# Patient Record
Sex: FEMALE | Race: WHITE | NOT HISPANIC OR LATINO | Employment: FULL TIME | URBAN - METROPOLITAN AREA
[De-identification: names, ages, dates, MRNs, and addresses within clinical notes are randomized per-mention and may not be internally consistent; named-entity substitution may affect disease eponyms.]

---

## 2015-09-04 LAB — EXTERNAL HIV SCREEN: NORMAL

## 2018-08-20 ENCOUNTER — OFFICE VISIT (OUTPATIENT)
Dept: INTERNAL MEDICINE CLINIC | Facility: CLINIC | Age: 38
End: 2018-08-20
Payer: COMMERCIAL

## 2018-08-20 VITALS
SYSTOLIC BLOOD PRESSURE: 120 MMHG | DIASTOLIC BLOOD PRESSURE: 82 MMHG | WEIGHT: 131.8 LBS | BODY MASS INDEX: 21.96 KG/M2 | HEART RATE: 96 BPM | OXYGEN SATURATION: 98 % | HEIGHT: 65 IN

## 2018-08-20 DIAGNOSIS — M79.10 MYALGIA: ICD-10-CM

## 2018-08-20 DIAGNOSIS — R53.83 FATIGUE, UNSPECIFIED TYPE: ICD-10-CM

## 2018-08-20 DIAGNOSIS — Z00.00 ANNUAL PHYSICAL EXAM: Primary | ICD-10-CM

## 2018-08-20 DIAGNOSIS — Z13.6 SCREENING FOR HEART DISEASE: ICD-10-CM

## 2018-08-20 PROCEDURE — 99385 PREV VISIT NEW AGE 18-39: CPT | Performed by: PHYSICIAN ASSISTANT

## 2018-08-20 NOTE — PROGRESS NOTES
Assessment/Plan:   General medical exam is excellent  Will supplement by doing screening labs  Will also check thyroid functions and vitamin-D level  Will discuss results when available  Recommended yearly follow-up  Review labs in 3 weeks  Return in about 3 weeks (around 9/10/2018) for Next scheduled follow up  Diagnoses and all orders for this visit:    Annual physical exam    Screening for heart disease  -     Lipid panel; Future    Fatigue, unspecified type  -     TSH, 3rd generation; Future  -     T4, free; Future  -     CBC and differential; Future  -     Comprehensive metabolic panel; Future    Myalgia  -     Vitamin D 25 hydroxy; Future          Subjective:      Patient ID: Karo Louis is a 45 y o  female  28-year-old white female presents to establish with this practice and for health assessment  Patient states she generally feels well, however recently has been feeling increased stress  She has noted change in her appetite and is not sleeping well  She feels anxious about going back to work, as she is a   She has noted some mild fatigue and irritability along with headaches recently  History/PE as documented in the EMR        ALLERGIES:  No Known Allergies    CURRENT MEDICATIONS:  No current outpatient prescriptions on file  ACTIVE PROBLEM LIST:  Patient Active Problem List   Diagnosis    Annual physical exam       PAST MEDICAL HISTORY:  No past medical history on file  PAST SURGICAL HISTORY:  No past surgical history on file      FAMILY HISTORY:  Family History   Problem Relation Age of Onset    COPD Father     Hyperlipidemia Father     Asthma Father     Heart disease Maternal Grandmother     Breast cancer Maternal Grandmother     Diabetes Maternal Grandmother     Heart disease Maternal Grandfather     Diabetes Maternal Grandfather     Diabetes Paternal Grandmother        SOCIAL HISTORY:  Social History     Social History    Marital status: /Civil [employer]     Spouse name: N/A    Number of children: N/A    Years of education: N/A     Occupational History    Not on file  Social History Main Topics    Smoking status: Never Smoker    Smokeless tobacco: Never Used    Alcohol use 1 2 oz/week     1 Glasses of wine, 1 Cans of beer per week      Comment: per week    Drug use: No    Sexual activity: Yes     Partners: Male     Other Topics Concern    Not on file     Social History Narrative        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, brushes twice daily    Hobbies-reading       Review of Systems   Constitutional: Negative for activity change, chills, fatigue and fever  HENT: Negative for congestion  Eyes: Negative for discharge  Respiratory: Negative for cough, chest tightness and shortness of breath  Cardiovascular: Negative for chest pain, palpitations and leg swelling  Gastrointestinal: Negative for abdominal pain  Genitourinary: Negative for difficulty urinating  Musculoskeletal: Negative for arthralgias and myalgias  Skin: Negative for rash  Allergic/Immunologic: Negative for immunocompromised state  Neurological: Positive for headaches  Negative for dizziness, syncope, weakness and light-headedness  Hematological: Negative for adenopathy  Does not bruise/bleed easily  Psychiatric/Behavioral: Positive for dysphoric mood and sleep disturbance  Negative for suicidal ideas  The patient is nervous/anxious  Objective:  Vitals:    08/20/18 1054   BP: 120/82   BP Location: Left arm   Patient Position: Sitting   Pulse: 96   SpO2: 98%   Weight: 59 8 kg (131 lb 12 8 oz)   Height: 5' 5" (1 651 m)        Physical Exam   Constitutional: She is oriented to person, place, and time  She appears well-developed and well-nourished  No distress     HENT:   Right Ear: External ear normal    Left Ear: External ear normal    Nose: Nose normal    Mouth/Throat: Oropharynx is clear and moist    Eyes: Conjunctivae and EOM are normal  Pupils are equal, round, and reactive to light  Neck: Normal range of motion  Neck supple  Carotid bruit is not present  No thyromegaly present  Cardiovascular: Normal rate, regular rhythm, normal heart sounds and intact distal pulses  Pulmonary/Chest: Effort normal and breath sounds normal    Abdominal: Soft  Bowel sounds are normal  There is no tenderness  Musculoskeletal: Normal range of motion  She exhibits no edema  Lymphadenopathy:     She has no cervical adenopathy  Neurological: She is alert and oriented to person, place, and time  She has normal reflexes  She displays normal reflexes  No cranial nerve deficit  She exhibits normal muscle tone  Coordination normal    Skin: Skin is warm and dry  No rash noted  Psychiatric: She has a normal mood and affect  Her behavior is normal  Judgment and thought content normal    Nursing note and vitals reviewed  RESULTS:    No results found for this or any previous visit (from the past 1008 hour(s))  This note was created with voice recognition software  Phonic, grammatical and spelling errors may be present within the note as a result

## 2018-08-20 NOTE — PATIENT INSTRUCTIONS
General medical exam is excellent  Will supplement by doing screening labs  Will also check thyroid functions and vitamin-D level  Will discuss results when available  Recommended yearly follow-up  Wellness Visit for Adults   AMBULATORY CARE:   A wellness visit  is when you see your healthcare provider to get screened for health problems  You can also get advice on how to stay healthy  Write down your questions so you remember to ask them  Ask your healthcare provider how often you should have a wellness visit  What happens at a wellness visit:  Your healthcare provider will ask about your health, and your family history of health problems  This includes high blood pressure, heart disease, and cancer  He or she will ask if you have symptoms that concern you, if you smoke, and about your mood  You may also be asked about your intake of medicines, supplements, food, and alcohol  Any of the following may be done:  · Your weight  will be checked  Your height may also be checked so your body mass index (BMI) can be calculated  Your BMI shows if you are at a healthy weight  · Your blood pressure  and heart rate will be checked  Your temperature may also be checked  · Blood and urine tests  may be done  Blood tests may be done to check your cholesterol levels  Abnormal cholesterol levels increase your risk for heart disease and stroke  You may also need a blood or urine test to check for diabetes if you are at increased risk  Urine tests may be done to look for signs of an infection or kidney disease  · A physical exam  includes checking your heartbeat and lungs with a stethoscope  Your healthcare provider may also check your skin to look for sun damage  · Screening tests  may be recommended  A screening test is done to check for diseases that may not cause symptoms  The screening tests you may need depend on your age, gender, family history, and lifestyle habits   For example, colorectal screening may be recommended if you are 48years old or older  Screening tests you need if you are a woman:   · A Pap smear  is used to screen for cervical cancer  Pap smears are usually done every 3 to 5 years depending on your age  You may need them more often if you have had abnormal Pap smear test results in the past  Ask your healthcare provider how often you should have a Pap smear  · A mammogram  is an x-ray of your breasts to screen for breast cancer  Experts recommend mammograms every 2 years starting at age 48 years  You may need a mammogram at age 52 years or younger if you have an increased risk for breast cancer  Talk to your healthcare provider about when you should start having mammograms and how often you need them  Vaccines you may need:   · Get an influenza vaccine  every year  The influenza vaccine protects you from the flu  Several types of viruses cause the flu  The viruses change over time, so new vaccines are made each year  · Get a tetanus-diphtheria (Td) booster vaccine  every 10 years  This vaccine protects you against tetanus and diphtheria  Tetanus is a severe infection that may cause painful muscle spasms and lockjaw  Diphtheria is a severe bacterial infection that causes a thick covering in the back of your mouth and throat  · Get a human papillomavirus (HPV) vaccine  if you are female and aged 23 to 32 or male 23 to 24 and never received it  This vaccine protects you from HPV infection  HPV is the most common infection spread by sexual contact  HPV may also cause vaginal, penile, and anal cancers  · Get a pneumococcal vaccine  if you are aged 72 years or older  The pneumococcal vaccine is an injection given to protect you from pneumococcal disease  Pneumococcal disease is an infection caused by pneumococcal bacteria  The infection may cause pneumonia, meningitis, or an ear infection  · Get a shingles vaccine  if you are aged 61 or older, even if you have had shingles before  The shingles vaccine is an injection to protect you from the varicella-zoster virus  This is the same virus that causes chickenpox  Shingles is a painful rash that develops in people who had chickenpox or have been exposed to the virus  How to eat healthy:  My Plate is a model for planning healthy meals  It shows the types and amounts of foods that should go on your plate  Fruits and vegetables make up about half of your plate, and grains and protein make up the other half  A serving of dairy is included on the side of your plate  The amount of calories and serving sizes you need depends on your age, gender, weight, and height  Examples of healthy foods are listed below:  · Eat a variety of vegetables  such as dark green, red, and orange vegetables  You can also include canned vegetables low in sodium (salt) and frozen vegetables without added butter or sauces  · Eat a variety of fresh fruits , canned fruit in 100% juice, frozen fruit, and dried fruit  · Include whole grains  At least half of the grains you eat should be whole grains  Examples include whole-wheat bread, wheat pasta, brown rice, and whole-grain cereals such as oatmeal     · Eat a variety of protein foods such as seafood (fish and shellfish), lean meat, and poultry without skin (turkey and chicken)  Examples of lean meats include pork leg, shoulder, or tenderloin, and beef round, sirloin, tenderloin, and extra lean ground beef  Other protein foods include eggs and egg substitutes, beans, peas, soy products, nuts, and seeds  · Choose low-fat dairy products such as skim or 1% milk or low-fat yogurt, cheese, and cottage cheese  · Limit unhealthy fats  such as butter, hard margarine, and shortening  Exercise:  Exercise at least 30 minutes per day on most days of the week  Some examples of exercise include walking, biking, dancing, and swimming   You can also fit in more physical activity by taking the stairs instead of the elevator or parking farther away from stores  Include muscle strengthening activities 2 days each week  Regular exercise provides many health benefits  It helps you manage your weight, and decreases your risk for type 2 diabetes, heart disease, stroke, and high blood pressure  Exercise can also help improve your mood  Ask your healthcare provider about the best exercise plan for you  General health and safety guidelines:   · Do not smoke  Nicotine and other chemicals in cigarettes and cigars can cause lung damage  Ask your healthcare provider for information if you currently smoke and need help to quit  E-cigarettes or smokeless tobacco still contain nicotine  Talk to your healthcare provider before you use these products  · Limit alcohol  A drink of alcohol is 12 ounces of beer, 5 ounces of wine, or 1½ ounces of liquor  · Lose weight, if needed  Being overweight increases your risk of certain health conditions  These include heart disease, high blood pressure, type 2 diabetes, and certain types of cancer  · Protect your skin  Do not sunbathe or use tanning beds  Use sunscreen with a SPF 15 or higher  Apply sunscreen at least 15 minutes before you go outside  Reapply sunscreen every 2 hours  Wear protective clothing, hats, and sunglasses when you are outside  · Drive safely  Always wear your seatbelt  Make sure everyone in your car wears a seatbelt  A seatbelt can save your life if you are in an accident  Do not use your cell phone when you are driving  This could distract you and cause an accident  Pull over if you need to make a call or send a text message  · Practice safe sex  Use latex condoms if are sexually active and have more than one partner  Your healthcare provider may recommend screening tests for sexually transmitted infections (STIs)  · Wear helmets, lifejackets, and protective gear    Always wear a helmet when you ride a bike or motorcycle, go skiing, or play sports that could cause a head injury  Wear protective equipment when you play sports  Wear a lifejacket when you are on a boat or doing water sports  © 2017 [address] [employer] Information is for End User's use only and may not be sold, redistributed or otherwise used for commercial purposes  All illustrations and images included in CareNotes® are the copyrighted property of A D A M [employer] Inc  or [employer]  The above information is an  only  It is not intended as medical advice for individual conditions or treatments  Talk to your doctor, nurse or pharmacist before following any medical regimen to see if it is safe and effective for you

## 2018-08-23 ENCOUNTER — APPOINTMENT (OUTPATIENT)
Dept: LAB | Facility: CLINIC | Age: 38
End: 2018-08-23
Payer: COMMERCIAL

## 2018-08-23 ENCOUNTER — TRANSCRIBE ORDERS (OUTPATIENT)
Dept: LAB | Facility: CLINIC | Age: 38
End: 2018-08-23

## 2018-08-23 DIAGNOSIS — Z13.6 SCREENING FOR HEART DISEASE: ICD-10-CM

## 2018-08-23 DIAGNOSIS — R53.83 FATIGUE, UNSPECIFIED TYPE: ICD-10-CM

## 2018-08-23 DIAGNOSIS — M79.10 MYALGIA: ICD-10-CM

## 2018-08-23 LAB
25(OH)D3 SERPL-MCNC: 35.2 NG/ML (ref 30–100)
ALBUMIN SERPL BCP-MCNC: 3.8 G/DL (ref 3.5–5)
ALP SERPL-CCNC: 47 U/L (ref 46–116)
ALT SERPL W P-5'-P-CCNC: 11 U/L (ref 12–78)
ANION GAP SERPL CALCULATED.3IONS-SCNC: 7 MMOL/L (ref 4–13)
AST SERPL W P-5'-P-CCNC: 8 U/L (ref 5–45)
BASOPHILS # BLD AUTO: 0.04 THOUSANDS/ΜL (ref 0–0.1)
BASOPHILS NFR BLD AUTO: 1 % (ref 0–1)
BILIRUB SERPL-MCNC: 1.36 MG/DL (ref 0.2–1)
BUN SERPL-MCNC: 16 MG/DL (ref 5–25)
CALCIUM SERPL-MCNC: 9.1 MG/DL (ref 8.3–10.1)
CHLORIDE SERPL-SCNC: 107 MMOL/L (ref 100–108)
CHOLEST SERPL-MCNC: 158 MG/DL (ref 50–200)
CO2 SERPL-SCNC: 26 MMOL/L (ref 21–32)
CREAT SERPL-MCNC: 0.71 MG/DL (ref 0.6–1.3)
EOSINOPHIL # BLD AUTO: 0.05 THOUSAND/ΜL (ref 0–0.61)
EOSINOPHIL NFR BLD AUTO: 1 % (ref 0–6)
ERYTHROCYTE [DISTWIDTH] IN BLOOD BY AUTOMATED COUNT: 12.1 % (ref 11.6–15.1)
GFR SERPL CREATININE-BSD FRML MDRD: 108 ML/MIN/1.73SQ M
GLUCOSE P FAST SERPL-MCNC: 70 MG/DL (ref 65–99)
HCT VFR BLD AUTO: 42.3 % (ref 34.8–46.1)
HDLC SERPL-MCNC: 53 MG/DL (ref 40–60)
HGB BLD-MCNC: 14.1 G/DL (ref 11.5–15.4)
IMM GRANULOCYTES # BLD AUTO: 0.01 THOUSAND/UL (ref 0–0.2)
IMM GRANULOCYTES NFR BLD AUTO: 0 % (ref 0–2)
LDLC SERPL CALC-MCNC: 92 MG/DL (ref 0–100)
LYMPHOCYTES # BLD AUTO: 2.19 THOUSANDS/ΜL (ref 0.6–4.47)
LYMPHOCYTES NFR BLD AUTO: 44 % (ref 14–44)
MCH RBC QN AUTO: 31.2 PG (ref 26.8–34.3)
MCHC RBC AUTO-ENTMCNC: 33.3 G/DL (ref 31.4–37.4)
MCV RBC AUTO: 94 FL (ref 82–98)
MONOCYTES # BLD AUTO: 0.32 THOUSAND/ΜL (ref 0.17–1.22)
MONOCYTES NFR BLD AUTO: 6 % (ref 4–12)
NEUTROPHILS # BLD AUTO: 2.41 THOUSANDS/ΜL (ref 1.85–7.62)
NEUTS SEG NFR BLD AUTO: 48 % (ref 43–75)
NONHDLC SERPL-MCNC: 105 MG/DL
NRBC BLD AUTO-RTO: 0 /100 WBCS
PLATELET # BLD AUTO: 226 THOUSANDS/UL (ref 149–390)
PMV BLD AUTO: 10.3 FL (ref 8.9–12.7)
POTASSIUM SERPL-SCNC: 4.3 MMOL/L (ref 3.5–5.3)
PROT SERPL-MCNC: 7.3 G/DL (ref 6.4–8.2)
RBC # BLD AUTO: 4.52 MILLION/UL (ref 3.81–5.12)
SODIUM SERPL-SCNC: 140 MMOL/L (ref 136–145)
T4 FREE SERPL-MCNC: 0.91 NG/DL (ref 0.76–1.46)
TRIGL SERPL-MCNC: 65 MG/DL
TSH SERPL DL<=0.05 MIU/L-ACNC: 2.5 UIU/ML (ref 0.36–3.74)
WBC # BLD AUTO: 5.02 THOUSAND/UL (ref 4.31–10.16)

## 2018-08-23 PROCEDURE — 80061 LIPID PANEL: CPT

## 2018-08-23 PROCEDURE — 84443 ASSAY THYROID STIM HORMONE: CPT

## 2018-08-23 PROCEDURE — 85025 COMPLETE CBC W/AUTO DIFF WBC: CPT

## 2018-08-23 PROCEDURE — 84439 ASSAY OF FREE THYROXINE: CPT

## 2018-08-23 PROCEDURE — 82306 VITAMIN D 25 HYDROXY: CPT

## 2018-08-23 PROCEDURE — 80053 COMPREHEN METABOLIC PANEL: CPT

## 2018-08-23 PROCEDURE — 36415 COLL VENOUS BLD VENIPUNCTURE: CPT

## 2018-09-14 ENCOUNTER — OFFICE VISIT (OUTPATIENT)
Dept: INTERNAL MEDICINE CLINIC | Facility: CLINIC | Age: 38
End: 2018-09-14
Payer: COMMERCIAL

## 2018-09-14 VITALS
TEMPERATURE: 98.9 F | OXYGEN SATURATION: 98 % | BODY MASS INDEX: 21.86 KG/M2 | HEIGHT: 65 IN | WEIGHT: 131.2 LBS | HEART RATE: 94 BPM | DIASTOLIC BLOOD PRESSURE: 68 MMHG | SYSTOLIC BLOOD PRESSURE: 128 MMHG

## 2018-09-14 DIAGNOSIS — F41.9 ANXIETY: Primary | ICD-10-CM

## 2018-09-14 PROCEDURE — 3008F BODY MASS INDEX DOCD: CPT | Performed by: PHYSICIAN ASSISTANT

## 2018-09-14 PROCEDURE — 99213 OFFICE O/P EST LOW 20 MIN: CPT | Performed by: PHYSICIAN ASSISTANT

## 2018-09-14 RX ORDER — FLUOXETINE 10 MG/1
10 TABLET, FILM COATED ORAL DAILY
Qty: 30 TABLET | Refills: 1 | Status: SHIPPED | OUTPATIENT
Start: 2018-09-14 | End: 2018-10-09 | Stop reason: SDUPTHER

## 2018-09-14 NOTE — PROGRESS NOTES
Assessment/Plan:   Patient Instructions   Will initiate therapy with low-dose fluoxetine 10 mg 1 daily  Will get together in 1 month the see how the medication is working  Return in about 4 weeks (around 10/12/2018)  Diagnoses and all orders for this visit:    Anxiety  -     FLUoxetine (PROzac) 10 MG tablet; Take 1 tablet (10 mg total) by mouth daily          Subjective:      Patient ID: Ace Lares is a 45 y o  female  Follow-up    Patient returns today to discuss her lab results  She also wanted to discuss anxiety/depression and treatment options that we had discussed at the end of her last visit  Her lab results are excellent  These were reviewed directly with the patient  Anxiety/depression:  Patient would like to start medication at this time  We discussed different medications available and have tried to narrow down 1 that will not cause significant weight gain  We reviewed potential side effects  She understands she needs to return in 1 month to assess its effectiveness  She was instructed that it would take approximately 2 weeks to start working  No other acute concerns today  ALLERGIES:  No Known Allergies    CURRENT MEDICATIONS:    Current Outpatient Prescriptions:     FLUoxetine (PROzac) 10 MG tablet, Take 1 tablet (10 mg total) by mouth daily, Disp: 30 tablet, Rfl: 1    ACTIVE PROBLEM LIST:  Patient Active Problem List   Diagnosis    Annual physical exam    Anxiety       PAST MEDICAL HISTORY:  No past medical history on file  PAST SURGICAL HISTORY:  No past surgical history on file      FAMILY HISTORY:  Family History   Problem Relation Age of Onset    COPD Father     Hyperlipidemia Father     Asthma Father     Heart disease Maternal Grandmother     Breast cancer Maternal Grandmother     Diabetes Maternal Grandmother     Heart disease Maternal Grandfather     Diabetes Maternal Grandfather     Diabetes Paternal Grandmother        SOCIAL HISTORY:  Social History     Social History    Marital status: /Civil Union     Spouse name: N/A    Number of children: N/A    Years of education: N/A     Occupational History    Not on file  Social History Main Topics    Smoking status: Never Smoker    Smokeless tobacco: Never Used    Alcohol use 1 2 oz/week     1 Glasses of wine, 1 Cans of beer per week      Comment: per week    Drug use: No    Sexual activity: Yes     Partners: Male     Other Topics Concern    Not on file     Social History Narrative        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, brushes twice daily    Hobbies-reading       Review of Systems   Constitutional: Negative for activity change, chills, fatigue and fever  HENT: Negative for congestion  Eyes: Negative for discharge  Respiratory: Negative for cough, chest tightness and shortness of breath  Cardiovascular: Negative for chest pain, palpitations and leg swelling  Gastrointestinal: Negative for abdominal pain  Genitourinary: Negative for difficulty urinating  Musculoskeletal: Negative for arthralgias and myalgias  Skin: Negative for rash  Allergic/Immunologic: Negative for immunocompromised state  Neurological: Negative for dizziness, syncope, weakness, light-headedness and headaches  Hematological: Negative for adenopathy  Does not bruise/bleed easily  Psychiatric/Behavioral: Positive for dysphoric mood  Negative for sleep disturbance and suicidal ideas  The patient is not nervous/anxious  Objective:  Vitals:    09/14/18 0757   BP: 128/68   BP Location: Left arm   Patient Position: Sitting   Pulse: 94   Temp: 98 9 °F (37 2 °C)   SpO2: 98%   Weight: 59 5 kg (131 lb 3 2 oz)   Height: 5' 5" (1 651 m)        Physical Exam   Constitutional: She is oriented to person, place, and time  She appears well-developed and well-nourished  Neck: Neck supple  No thyromegaly present     Cardiovascular: Normal rate, regular rhythm and normal heart sounds  Pulmonary/Chest: Effort normal and breath sounds normal    Abdominal: There is no tenderness  Musculoskeletal: She exhibits no edema  Neurological: She is alert and oriented to person, place, and time  Skin: Skin is warm and dry  No rash noted  Psychiatric: She has a normal mood and affect  Her behavior is normal    Nursing note and vitals reviewed          RESULTS:    Recent Results (from the past 1008 hour(s))   TSH, 3rd generation    Collection Time: 08/23/18  7:20 AM   Result Value Ref Range    TSH 3RD GENERATON 2 500 0 358 - 3 740 uIU/mL   T4, free    Collection Time: 08/23/18  7:20 AM   Result Value Ref Range    Free T4 0 91 0 76 - 1 46 ng/dL   CBC and differential    Collection Time: 08/23/18  7:20 AM   Result Value Ref Range    WBC 5 02 4 31 - 10 16 Thousand/uL    RBC 4 52 3 81 - 5 12 Million/uL    Hemoglobin 14 1 11 5 - 15 4 g/dL    Hematocrit 42 3 34 8 - 46 1 %    MCV 94 82 - 98 fL    MCH 31 2 26 8 - 34 3 pg    MCHC 33 3 31 4 - 37 4 g/dL    RDW 12 1 11 6 - 15 1 %    MPV 10 3 8 9 - 12 7 fL    Platelets 851 133 - 649 Thousands/uL    nRBC 0 /100 WBCs    Neutrophils Relative 48 43 - 75 %    Immat GRANS % 0 0 - 2 %    Lymphocytes Relative 44 14 - 44 %    Monocytes Relative 6 4 - 12 %    Eosinophils Relative 1 0 - 6 %    Basophils Relative 1 0 - 1 %    Neutrophils Absolute 2 41 1 85 - 7 62 Thousands/µL    Immature Grans Absolute 0 01 0 00 - 0 20 Thousand/uL    Lymphocytes Absolute 2 19 0 60 - 4 47 Thousands/µL    Monocytes Absolute 0 32 0 17 - 1 22 Thousand/µL    Eosinophils Absolute 0 05 0 00 - 0 61 Thousand/µL    Basophils Absolute 0 04 0 00 - 0 10 Thousands/µL   Comprehensive metabolic panel    Collection Time: 08/23/18  7:20 AM   Result Value Ref Range    Sodium 140 136 - 145 mmol/L    Potassium 4 3 3 5 - 5 3 mmol/L    Chloride 107 100 - 108 mmol/L    CO2 26 21 - 32 mmol/L    ANION GAP 7 4 - 13 mmol/L    BUN 16 5 - 25 mg/dL    Creatinine 0 71 0 60 - 1 30 mg/dL    Glucose, Fasting 70 65 - 99 mg/dL    Calcium 9 1 8 3 - 10 1 mg/dL    AST 8 5 - 45 U/L    ALT 11 (L) 12 - 78 U/L    Alkaline Phosphatase 47 46 - 116 U/L    Total Protein 7 3 6 4 - 8 2 g/dL    Albumin 3 8 3 5 - 5 0 g/dL    Total Bilirubin 1 36 (H) 0 20 - 1 00 mg/dL    eGFR 108 ml/min/1 73sq m   Lipid panel    Collection Time: 08/23/18  7:20 AM   Result Value Ref Range    Cholesterol 158 50 - 200 mg/dL    Triglycerides 65 <=150 mg/dL    HDL, Direct 53 40 - 60 mg/dL    LDL Calculated 92 0 - 100 mg/dL    Non-HDL-Chol (CHOL-HDL) 105 mg/dl   Vitamin D 25 hydroxy    Collection Time: 08/23/18  7:20 AM   Result Value Ref Range    Vit D, 25-Hydroxy 35 2 30 0 - 100 0 ng/mL       This note was created with voice recognition software  Phonic, grammatical and spelling errors may be present within the note as a result

## 2018-09-14 NOTE — PATIENT INSTRUCTIONS
Will initiate therapy with low-dose fluoxetine 10 mg 1 daily  Will get together in 1 month the see how the medication is working

## 2018-10-09 ENCOUNTER — OFFICE VISIT (OUTPATIENT)
Dept: INTERNAL MEDICINE CLINIC | Facility: CLINIC | Age: 38
End: 2018-10-09
Payer: COMMERCIAL

## 2018-10-09 VITALS
RESPIRATION RATE: 16 BRPM | BODY MASS INDEX: 21.49 KG/M2 | HEART RATE: 98 BPM | OXYGEN SATURATION: 99 % | DIASTOLIC BLOOD PRESSURE: 68 MMHG | SYSTOLIC BLOOD PRESSURE: 110 MMHG | WEIGHT: 129 LBS | HEIGHT: 65 IN

## 2018-10-09 DIAGNOSIS — F41.9 ANXIETY: Primary | ICD-10-CM

## 2018-10-09 PROCEDURE — 99213 OFFICE O/P EST LOW 20 MIN: CPT | Performed by: PHYSICIAN ASSISTANT

## 2018-10-09 RX ORDER — FLUOXETINE 20 MG/1
20 TABLET, FILM COATED ORAL DAILY
Qty: 30 TABLET | Refills: 1 | Status: SHIPPED | OUTPATIENT
Start: 2018-10-09 | End: 2018-12-02 | Stop reason: SDUPTHER

## 2018-10-09 NOTE — PROGRESS NOTES
Assessment/Plan:   Patient Instructions   Will increase Fluoxetine to 20 mg daily  Recheck in one month, sooner as needed  Did advise starting vitamin D3 1000 units daily  No Follow-up on file  Diagnoses and all orders for this visit:    Anxiety  -     FLUoxetine (PROzac) 20 MG tablet; Take 1 tablet (20 mg total) by mouth daily          Subjective:      Patient ID: Sahara Harper is a 45 y o  female  Follow up    Anxiety:  At last visit we initiated fluoxetine  Patient states she has not really sure she has felt any benefit so far  She notes during early to late afternoon she started feeling tired and yawns a lot  Her appetite also has not been very good  She does feel the anxiety may be slightly better  Overall stressors about the same  No other new concerns  ALLERGIES:  No Known Allergies    CURRENT MEDICATIONS:    Current Outpatient Prescriptions:     FLUoxetine (PROzac) 20 MG tablet, Take 1 tablet (20 mg total) by mouth daily, Disp: 30 tablet, Rfl: 1    ACTIVE PROBLEM LIST:  Patient Active Problem List   Diagnosis    Annual physical exam    Anxiety       PAST MEDICAL HISTORY:  No past medical history on file  PAST SURGICAL HISTORY:  No past surgical history on file  FAMILY HISTORY:  Family History   Problem Relation Age of Onset    COPD Father     Hyperlipidemia Father     Asthma Father     Heart disease Maternal Grandmother     Breast cancer Maternal Grandmother     Diabetes Maternal Grandmother     Heart disease Maternal Grandfather     Diabetes Maternal Grandfather     Diabetes Paternal Grandmother        SOCIAL HISTORY:  Social History     Social History    Marital status: /Civil Union     Spouse name: N/A    Number of children: N/A    Years of education: N/A     Occupational History    Not on file       Social History Main Topics    Smoking status: Never Smoker    Smokeless tobacco: Never Used    Alcohol use 1 2 oz/week     1 Glasses of wine, 1 Cans of beer per week      Comment: per week    Drug use: No    Sexual activity: Yes     Partners: Male     Other Topics Concern    Not on file     Social History Narrative        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, brushes twice daily    Hobbies-reading       Review of Systems   Constitutional: Positive for fatigue  Negative for activity change, chills and fever  HENT: Negative for congestion  Eyes: Negative for discharge  Respiratory: Negative for cough, chest tightness and shortness of breath  Cardiovascular: Negative for chest pain, palpitations and leg swelling  Gastrointestinal: Negative for abdominal pain  Genitourinary: Negative for difficulty urinating  Musculoskeletal: Negative for arthralgias and myalgias  Skin: Negative for rash  Allergic/Immunologic: Negative for immunocompromised state  Neurological: Negative for dizziness, syncope, weakness, light-headedness and headaches  Hematological: Negative for adenopathy  Does not bruise/bleed easily  Psychiatric/Behavioral: Negative for dysphoric mood, sleep disturbance and suicidal ideas  The patient is nervous/anxious  Objective:  Vitals:    10/09/18 0754   BP: 110/68   BP Location: Left arm   Patient Position: Sitting   Cuff Size: Adult   Pulse: 98   Resp: 16   SpO2: 99%   Weight: 58 5 kg (129 lb)   Height: 5' 5" (1 651 m)        Physical Exam   Constitutional: She is oriented to person, place, and time  She appears well-developed and well-nourished  No distress  Neck: Carotid bruit is not present  No thyromegaly present  Cardiovascular: Normal rate, regular rhythm and normal heart sounds  Pulmonary/Chest: Effort normal and breath sounds normal    Musculoskeletal: She exhibits no edema  Lymphadenopathy:     She has no cervical adenopathy  Neurological: She is alert and oriented to person, place, and time  Skin: Skin is warm and dry  No rash noted     Psychiatric: She has a normal mood and affect  Her behavior is normal    Nursing note and vitals reviewed  RESULTS:    No results found for this or any previous visit (from the past 1008 hour(s))  This note was created with voice recognition software  Phonic, grammatical and spelling errors may be present within the note as a result

## 2018-11-13 ENCOUNTER — OFFICE VISIT (OUTPATIENT)
Dept: INTERNAL MEDICINE CLINIC | Facility: CLINIC | Age: 38
End: 2018-11-13
Payer: COMMERCIAL

## 2018-11-13 VITALS
SYSTOLIC BLOOD PRESSURE: 118 MMHG | DIASTOLIC BLOOD PRESSURE: 68 MMHG | BODY MASS INDEX: 21.86 KG/M2 | WEIGHT: 131.2 LBS | HEART RATE: 88 BPM | OXYGEN SATURATION: 98 % | HEIGHT: 65 IN

## 2018-11-13 DIAGNOSIS — F41.9 ANXIETY: Primary | ICD-10-CM

## 2018-11-13 PROCEDURE — 99213 OFFICE O/P EST LOW 20 MIN: CPT | Performed by: PHYSICIAN ASSISTANT

## 2018-11-13 PROCEDURE — 3008F BODY MASS INDEX DOCD: CPT | Performed by: PHYSICIAN ASSISTANT

## 2018-11-13 NOTE — PROGRESS NOTES
Assessment/Plan:   Patient Instructions   Continue current medication  Follow up 6 months, sooner as needed  Return in about 6 months (around 5/13/2019) for Next scheduled follow up  Diagnoses and all orders for this visit:    Anxiety          Subjective:      Patient ID: Jaimie Candelario is a 45 y o  female  Follow-up    Anxiety:  Patient feels she is doing very well on the current dose of fluoxetine  She sees a big improvement in her anxiety, concentration, mood, focus and interaction with family in coworkers  Does not feel she needs any change in her dose  Denies any side effects at this time  Appetite has improved and she is happy with her slight weight gain  Remains very active  ALLERGIES:  No Known Allergies    CURRENT MEDICATIONS:    Current Outpatient Prescriptions:     FLUoxetine (PROzac) 20 MG tablet, Take 1 tablet (20 mg total) by mouth daily, Disp: 30 tablet, Rfl: 1    ACTIVE PROBLEM LIST:  Patient Active Problem List   Diagnosis    Annual physical exam    Anxiety       PAST MEDICAL HISTORY:  No past medical history on file  PAST SURGICAL HISTORY:  No past surgical history on file  FAMILY HISTORY:  Family History   Problem Relation Age of Onset    COPD Father     Hyperlipidemia Father     Asthma Father     Heart disease Maternal Grandmother     Breast cancer Maternal Grandmother     Diabetes Maternal Grandmother     Heart disease Maternal Grandfather     Diabetes Maternal Grandfather     Diabetes Paternal Grandmother        SOCIAL HISTORY:  Social History     Social History    Marital status: /Civil Union     Spouse name: N/A    Number of children: N/A    Years of education: N/A     Occupational History    Not on file       Social History Main Topics    Smoking status: Never Smoker    Smokeless tobacco: Never Used    Alcohol use 1 2 oz/week     1 Glasses of wine, 1 Cans of beer per week      Comment: per week    Drug use: No    Sexual activity: Yes     Partners: Male     Other Topics Concern    Not on file     Social History Narrative        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, brushes twice daily    Hobbies-reading       Review of Systems   Constitutional: Negative for activity change, chills, fatigue and fever  HENT: Negative for congestion  Eyes: Negative for discharge  Respiratory: Negative for cough, chest tightness and shortness of breath  Cardiovascular: Negative for chest pain, palpitations and leg swelling  Gastrointestinal: Negative for abdominal pain  Genitourinary: Negative for difficulty urinating  Musculoskeletal: Negative for arthralgias and myalgias  Skin: Negative for rash  Allergic/Immunologic: Negative for immunocompromised state  Neurological: Negative for dizziness, syncope, weakness, light-headedness and headaches  Hematological: Negative for adenopathy  Does not bruise/bleed easily  Psychiatric/Behavioral: Negative for agitation, confusion, decreased concentration and dysphoric mood  The patient is not nervous/anxious  Objective:  Vitals:    11/13/18 0755   BP: 118/68   BP Location: Left arm   Patient Position: Sitting   Pulse: 88   SpO2: 98%   Weight: 59 5 kg (131 lb 3 2 oz)   Height: 5' 5" (1 651 m)        Physical Exam   Constitutional: She is oriented to person, place, and time  She appears well-developed and well-nourished  No distress  Neck: Neck supple  Cardiovascular: Normal rate, regular rhythm and normal heart sounds  Pulmonary/Chest: Effort normal and breath sounds normal    Musculoskeletal: She exhibits no edema  Neurological: She is alert and oriented to person, place, and time  Skin: Skin is warm and dry  No rash noted  Psychiatric: She has a normal mood and affect  Her behavior is normal    Nursing note and vitals reviewed  RESULTS:    No results found for this or any previous visit (from the past 1008 hour(s))      This note was created with voice recognition software  Phonic, grammatical and spelling errors may be present within the note as a result

## 2018-12-02 DIAGNOSIS — F41.9 ANXIETY: ICD-10-CM

## 2018-12-03 RX ORDER — FLUOXETINE 20 MG/1
TABLET, FILM COATED ORAL
Qty: 30 TABLET | Refills: 5 | Status: SHIPPED | OUTPATIENT
Start: 2018-12-03 | End: 2019-05-19 | Stop reason: SDUPTHER

## 2019-05-17 ENCOUNTER — OFFICE VISIT (OUTPATIENT)
Dept: INTERNAL MEDICINE CLINIC | Facility: CLINIC | Age: 39
End: 2019-05-17
Payer: COMMERCIAL

## 2019-05-17 VITALS
OXYGEN SATURATION: 99 % | WEIGHT: 134 LBS | BODY MASS INDEX: 22.33 KG/M2 | HEIGHT: 65 IN | RESPIRATION RATE: 16 BRPM | HEART RATE: 88 BPM | DIASTOLIC BLOOD PRESSURE: 76 MMHG | SYSTOLIC BLOOD PRESSURE: 102 MMHG

## 2019-05-17 DIAGNOSIS — Z13.6 SCREENING FOR HEART DISEASE: ICD-10-CM

## 2019-05-17 DIAGNOSIS — B00.1 RECURRENT COLD SORES: ICD-10-CM

## 2019-05-17 DIAGNOSIS — F41.9 ANXIETY: Primary | ICD-10-CM

## 2019-05-17 PROCEDURE — 3008F BODY MASS INDEX DOCD: CPT | Performed by: PHYSICIAN ASSISTANT

## 2019-05-17 PROCEDURE — 99213 OFFICE O/P EST LOW 20 MIN: CPT | Performed by: PHYSICIAN ASSISTANT

## 2019-05-17 PROCEDURE — 1036F TOBACCO NON-USER: CPT | Performed by: PHYSICIAN ASSISTANT

## 2019-05-19 DIAGNOSIS — F41.9 ANXIETY: ICD-10-CM

## 2019-05-20 RX ORDER — FLUOXETINE 20 MG/1
TABLET, FILM COATED ORAL
Qty: 30 TABLET | Refills: 4 | Status: SHIPPED | OUTPATIENT
Start: 2019-05-20 | End: 2019-08-17 | Stop reason: SDUPTHER

## 2019-08-17 DIAGNOSIS — F41.9 ANXIETY: ICD-10-CM

## 2019-08-19 RX ORDER — FLUOXETINE 20 MG/1
TABLET, FILM COATED ORAL
Qty: 90 TABLET | Refills: 1 | Status: SHIPPED | OUTPATIENT
Start: 2019-08-19 | End: 2019-12-16

## 2019-11-20 ENCOUNTER — APPOINTMENT (OUTPATIENT)
Dept: LAB | Facility: CLINIC | Age: 39
End: 2019-11-20
Payer: COMMERCIAL

## 2019-11-20 DIAGNOSIS — B00.1 RECURRENT COLD SORES: ICD-10-CM

## 2019-11-20 DIAGNOSIS — Z13.6 SCREENING FOR HEART DISEASE: ICD-10-CM

## 2019-11-20 LAB
ALBUMIN SERPL BCP-MCNC: 3.6 G/DL (ref 3.5–5)
ALP SERPL-CCNC: 51 U/L (ref 46–116)
ALT SERPL W P-5'-P-CCNC: 12 U/L (ref 12–78)
ANION GAP SERPL CALCULATED.3IONS-SCNC: 1 MMOL/L (ref 4–13)
AST SERPL W P-5'-P-CCNC: 10 U/L (ref 5–45)
BASOPHILS # BLD AUTO: 0.05 THOUSANDS/ΜL (ref 0–0.1)
BASOPHILS NFR BLD AUTO: 1 % (ref 0–1)
BILIRUB SERPL-MCNC: 1.41 MG/DL (ref 0.2–1)
BUN SERPL-MCNC: 11 MG/DL (ref 5–25)
CALCIUM SERPL-MCNC: 8.9 MG/DL (ref 8.3–10.1)
CHLORIDE SERPL-SCNC: 109 MMOL/L (ref 100–108)
CHOLEST SERPL-MCNC: 174 MG/DL (ref 50–200)
CO2 SERPL-SCNC: 28 MMOL/L (ref 21–32)
CREAT SERPL-MCNC: 0.72 MG/DL (ref 0.6–1.3)
EOSINOPHIL # BLD AUTO: 0.05 THOUSAND/ΜL (ref 0–0.61)
EOSINOPHIL NFR BLD AUTO: 1 % (ref 0–6)
ERYTHROCYTE [DISTWIDTH] IN BLOOD BY AUTOMATED COUNT: 11.9 % (ref 11.6–15.1)
GFR SERPL CREATININE-BSD FRML MDRD: 106 ML/MIN/1.73SQ M
GLUCOSE P FAST SERPL-MCNC: 92 MG/DL (ref 65–99)
HCT VFR BLD AUTO: 40.1 % (ref 34.8–46.1)
HDLC SERPL-MCNC: 59 MG/DL
HGB BLD-MCNC: 13.5 G/DL (ref 11.5–15.4)
IMM GRANULOCYTES # BLD AUTO: 0.01 THOUSAND/UL (ref 0–0.2)
IMM GRANULOCYTES NFR BLD AUTO: 0 % (ref 0–2)
LDLC SERPL CALC-MCNC: 104 MG/DL (ref 0–100)
LYMPHOCYTES # BLD AUTO: 1.85 THOUSANDS/ΜL (ref 0.6–4.47)
LYMPHOCYTES NFR BLD AUTO: 42 % (ref 14–44)
MCH RBC QN AUTO: 31 PG (ref 26.8–34.3)
MCHC RBC AUTO-ENTMCNC: 33.7 G/DL (ref 31.4–37.4)
MCV RBC AUTO: 92 FL (ref 82–98)
MONOCYTES # BLD AUTO: 0.31 THOUSAND/ΜL (ref 0.17–1.22)
MONOCYTES NFR BLD AUTO: 7 % (ref 4–12)
NEUTROPHILS # BLD AUTO: 2.16 THOUSANDS/ΜL (ref 1.85–7.62)
NEUTS SEG NFR BLD AUTO: 49 % (ref 43–75)
NONHDLC SERPL-MCNC: 115 MG/DL
NRBC BLD AUTO-RTO: 0 /100 WBCS
PLATELET # BLD AUTO: 226 THOUSANDS/UL (ref 149–390)
PMV BLD AUTO: 10.4 FL (ref 8.9–12.7)
POTASSIUM SERPL-SCNC: 4.9 MMOL/L (ref 3.5–5.3)
PROT SERPL-MCNC: 7 G/DL (ref 6.4–8.2)
RBC # BLD AUTO: 4.35 MILLION/UL (ref 3.81–5.12)
SODIUM SERPL-SCNC: 138 MMOL/L (ref 136–145)
TRIGL SERPL-MCNC: 56 MG/DL
WBC # BLD AUTO: 4.43 THOUSAND/UL (ref 4.31–10.16)

## 2019-11-20 PROCEDURE — 80061 LIPID PANEL: CPT

## 2019-11-20 PROCEDURE — 80053 COMPREHEN METABOLIC PANEL: CPT

## 2019-11-20 PROCEDURE — 36415 COLL VENOUS BLD VENIPUNCTURE: CPT

## 2019-11-20 PROCEDURE — 85025 COMPLETE CBC W/AUTO DIFF WBC: CPT

## 2019-11-22 ENCOUNTER — OFFICE VISIT (OUTPATIENT)
Dept: INTERNAL MEDICINE CLINIC | Facility: CLINIC | Age: 39
End: 2019-11-22
Payer: COMMERCIAL

## 2019-11-22 VITALS
HEART RATE: 68 BPM | DIASTOLIC BLOOD PRESSURE: 70 MMHG | WEIGHT: 137 LBS | RESPIRATION RATE: 16 BRPM | BODY MASS INDEX: 22.82 KG/M2 | SYSTOLIC BLOOD PRESSURE: 110 MMHG | HEIGHT: 65 IN | OXYGEN SATURATION: 98 %

## 2019-11-22 DIAGNOSIS — B00.1 RECURRENT COLD SORES: ICD-10-CM

## 2019-11-22 DIAGNOSIS — F41.9 ANXIETY: Primary | ICD-10-CM

## 2019-11-22 PROCEDURE — 99213 OFFICE O/P EST LOW 20 MIN: CPT | Performed by: PHYSICIAN ASSISTANT

## 2019-11-22 PROCEDURE — 1036F TOBACCO NON-USER: CPT | Performed by: PHYSICIAN ASSISTANT

## 2019-11-22 NOTE — PATIENT INSTRUCTIONS
No change in current medications  Will reorder Denavir  If pharmacy does not get this or does not cover it we can change the topical   Follow-up 6 months, sooner as needed

## 2019-12-06 DIAGNOSIS — B00.1 RECURRENT COLD SORES: ICD-10-CM

## 2019-12-12 ENCOUNTER — TELEPHONE (OUTPATIENT)
Dept: INTERNAL MEDICINE CLINIC | Facility: CLINIC | Age: 39
End: 2019-12-12

## 2019-12-12 DIAGNOSIS — F41.9 ANXIETY: ICD-10-CM

## 2019-12-16 RX ORDER — FLUOXETINE HYDROCHLORIDE 20 MG/1
20 CAPSULE ORAL DAILY
Qty: 30 CAPSULE | Refills: 3 | Status: SHIPPED | OUTPATIENT
Start: 2019-12-16 | End: 2020-03-17

## 2020-02-09 DIAGNOSIS — F41.9 ANXIETY: ICD-10-CM

## 2020-02-09 RX ORDER — FLUOXETINE 20 MG/1
TABLET, FILM COATED ORAL
Qty: 90 TABLET | Refills: 1 | Status: SHIPPED | OUTPATIENT
Start: 2020-02-09 | End: 2020-03-17 | Stop reason: SDUPTHER

## 2020-03-17 DIAGNOSIS — F41.9 ANXIETY: ICD-10-CM

## 2020-03-17 RX ORDER — FLUOXETINE HYDROCHLORIDE 20 MG/1
CAPSULE ORAL
Qty: 90 CAPSULE | Refills: 1 | Status: SHIPPED | OUTPATIENT
Start: 2020-03-17 | End: 2020-09-10

## 2020-09-10 DIAGNOSIS — F41.9 ANXIETY: ICD-10-CM

## 2020-09-10 RX ORDER — FLUOXETINE HYDROCHLORIDE 20 MG/1
CAPSULE ORAL
Qty: 90 CAPSULE | Refills: 0 | Status: SHIPPED | OUTPATIENT
Start: 2020-09-10 | End: 2022-04-08

## 2020-11-29 DIAGNOSIS — F41.9 ANXIETY: ICD-10-CM

## 2020-11-30 RX ORDER — FLUOXETINE HYDROCHLORIDE 20 MG/1
CAPSULE ORAL
Qty: 90 CAPSULE | Refills: 0 | OUTPATIENT
Start: 2020-11-30

## 2021-03-26 DIAGNOSIS — Z23 ENCOUNTER FOR IMMUNIZATION: ICD-10-CM

## 2021-04-15 ENCOUNTER — OFFICE VISIT (OUTPATIENT)
Dept: INTERNAL MEDICINE CLINIC | Facility: CLINIC | Age: 41
End: 2021-04-15
Payer: COMMERCIAL

## 2021-04-15 VITALS
DIASTOLIC BLOOD PRESSURE: 60 MMHG | RESPIRATION RATE: 16 BRPM | TEMPERATURE: 97.5 F | OXYGEN SATURATION: 99 % | SYSTOLIC BLOOD PRESSURE: 112 MMHG | HEART RATE: 95 BPM | WEIGHT: 153.2 LBS | BODY MASS INDEX: 25.52 KG/M2 | HEIGHT: 65 IN

## 2021-04-15 DIAGNOSIS — Z00.00 ANNUAL PHYSICAL EXAM: Primary | ICD-10-CM

## 2021-04-15 DIAGNOSIS — F41.9 ANXIETY: ICD-10-CM

## 2021-04-15 DIAGNOSIS — Z13.6 SCREENING FOR HEART DISEASE: ICD-10-CM

## 2021-04-15 PROCEDURE — 99396 PREV VISIT EST AGE 40-64: CPT | Performed by: PHYSICIAN ASSISTANT

## 2021-04-15 PROCEDURE — 1036F TOBACCO NON-USER: CPT | Performed by: PHYSICIAN ASSISTANT

## 2021-04-15 PROCEDURE — 3725F SCREEN DEPRESSION PERFORMED: CPT | Performed by: PHYSICIAN ASSISTANT

## 2021-04-15 PROCEDURE — 3008F BODY MASS INDEX DOCD: CPT | Performed by: PHYSICIAN ASSISTANT

## 2021-04-15 RX ORDER — MEDROXYPROGESTERONE ACETATE 150 MG/ML
INJECTION, SUSPENSION INTRAMUSCULAR
COMMUNITY
Start: 2021-02-27 | End: 2022-04-20 | Stop reason: ALTCHOICE

## 2021-04-15 NOTE — PATIENT INSTRUCTIONS
General medical exam remains excellent  Forms completed for camp  Will have patient do screening labs and I will discuss results with her when available  Recommend yearly follow-up, sooner as needed

## 2021-04-15 NOTE — PROGRESS NOTES
Assessment/Plan:   Patient Instructions   General medical exam remains excellent  Forms completed for [employer]  Will have patient do screening labs and I will discuss results with her when available  Recommend yearly follow-up, sooner as needed  Quality Measures: BMI Counseling: Body mass index is 25 49 kg/m²  The BMI is above normal  Nutrition recommendations include decreasing portion sizes, encouraging healthy choices of fruits and vegetables, consuming healthier snacks and moderation in carbohydrate intake  Exercise recommendations include exercising 3-5 times per week  Return in about 1 year (around 4/15/2022) for Annual physical          Diagnoses and all orders for this visit:    Annual physical exam    Anxiety  -     CBC and differential; Future    Screening for heart disease  -     Comprehensive metabolic panel; Future  -     Lipid panel; Future    Other orders  -     medroxyPROGESTERone acetate (DEPO-PROVERA SYRINGE) 150 mg/mL injection; INJECT 150 MG INTO THE MUSCLE EVERY 3 (THREE) MONTHS          Subjective:      Patient ID: Dakotah Ramírez is a 36 y o  female  51-year-old female presents for annual physical and physical needed for attending 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[facility] [employer] and is required to have a physical plus further training  She is looking forward to attending [employer]  She denies any other focal issues  Current medications reviewed  EMR has been reviewed, clarified, and updated with patient today        ALLERGIES:  No Known Allergies    CURRENT MEDICATIONS:    Current Outpatient Medications:     FLUoxetine (PROzac) 20 mg capsule, TAKE 1 CAPSULE BY MOUTH EVERY DAY, Disp: 90 capsule, Rfl: 0    medroxyPROGESTERone acetate (DEPO-PROVERA SYRINGE) 150 mg/mL injection, INJECT 150 MG INTO THE MUSCLE EVERY 3 (THREE) MONTHS, Disp: , Rfl:     penciclovir (DENAVIR) 1 % cream, Apply topically every 2 (two) hours, Disp: 1 5 g, Rfl: 5    ACTIVE PROBLEM LIST:  Patient Active Problem List   Diagnosis    Annual physical exam    Anxiety       PAST MEDICAL HISTORY:  History reviewed  No pertinent past medical history  PAST SURGICAL HISTORY:  History reviewed  No pertinent surgical history  FAMILY HISTORY:  Family History   Problem Relation Age of Onset    COPD Father     Hyperlipidemia Father     Asthma Father     Heart disease Maternal Grandmother     Breast cancer Maternal Grandmother     Diabetes Maternal Grandmother     Heart disease Maternal Grandfather     Diabetes Maternal Grandfather     Diabetes Paternal Grandmother        SOCIAL HISTORY:  Social History     Socioeconomic History    Marital status: /Civil Union     Spouse name: Not on file    Number of children: Not on file    Years of education: Not on file    Highest education level: Not on file   Occupational History    Not on file   Social Needs    Financial resource strain: Not on file    Food insecurity     Worry: Not on file     Inability: Not on file   [employer] needs     Medical: Not on file     Non-medical: Not on file   Tobacco Use    Smoking status: Never Smoker    Smokeless tobacco: Never Used   Substance and Sexual Activity    Alcohol use:  Yes     Alcohol/week: 2 0 standard drinks     Types: 1 Glasses of wine, 1 Cans of beer per week     Comment: per week    Drug use: No    Sexual activity: Yes     Partners: Male   Lifestyle    Physical activity     Days per week: Not on file     Minutes per session: Not on file    Stress: Not on file   Relationships    Social connections     Talks on phone: Not on file     Gets together: Not on file     Attends Islam service: Not on file     Active member of club or organization: Not on file     Attends meetings of clubs or organizations: Not on file     Relationship status: Not on file    Intimate partner violence     Fear of current or ex partner: Not on file     Emotionally abused: Not on file Physically abused: Not on file     Forced sexual activity: Not on file   Other Topics Concern    Not on file   Social History Narrative        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, brushes twice daily    Hobbies-reading       Review of Systems   Constitutional: Negative for activity change, chills, fatigue and fever  HENT: Negative for congestion  Eyes: Negative for discharge  Respiratory: Negative for cough, chest tightness and shortness of breath  Cardiovascular: Negative for chest pain, palpitations and leg swelling  Gastrointestinal: Negative for abdominal pain, blood in stool, constipation, diarrhea, nausea and vomiting  Endocrine: Negative for polydipsia, polyphagia and polyuria  Genitourinary: Negative for difficulty urinating  Musculoskeletal: Negative for arthralgias and myalgias  Skin: Negative for rash  Allergic/Immunologic: Negative for immunocompromised state  Neurological: Negative for dizziness, syncope, weakness, light-headedness and headaches  Hematological: Negative for adenopathy  Does not bruise/bleed easily  Psychiatric/Behavioral: Negative for dysphoric mood, sleep disturbance and suicidal ideas  The patient is not nervous/anxious  Objective:  Vitals:    04/15/21 1056   BP: 112/60   BP Location: Right arm   Patient Position: Sitting   Cuff Size: Standard   Pulse: 95   Resp: 16   Temp: 97 5 °F (36 4 °C)   TempSrc: Tympanic   SpO2: 99%   Weight: 69 5 kg (153 lb 3 2 oz)   Height: 5' 5" (1 651 m)     Body mass index is 25 49 kg/m²  Physical Exam  Vitals signs and nursing note reviewed  Constitutional:       General: She is not in acute distress  Appearance: Normal appearance  She is well-developed  She is not ill-appearing  HENT:      Head: Normocephalic and atraumatic        Right Ear: Tympanic membrane, ear canal and external ear normal       Left Ear: Tympanic membrane, ear canal and external ear normal       Nose: Nose normal  Mouth/Throat:      Mouth: Mucous membranes are moist       Pharynx: Oropharynx is clear  Eyes:      General: No scleral icterus  Right eye: No discharge  Left eye: No discharge  Conjunctiva/sclera: Conjunctivae normal       Pupils: Pupils are equal, round, and reactive to light  Neck:      Musculoskeletal: Normal range of motion and neck supple  Thyroid: No thyromegaly  Vascular: No carotid bruit  Cardiovascular:      Rate and Rhythm: Normal rate and regular rhythm  Pulses: Normal pulses  Heart sounds: Normal heart sounds  No murmur  Pulmonary:      Effort: Pulmonary effort is normal  No respiratory distress  Breath sounds: Normal breath sounds  Chest:      Chest wall: No tenderness  Abdominal:      General: Abdomen is flat  Bowel sounds are normal  There is no distension  Palpations: Abdomen is soft  There is no hepatomegaly, splenomegaly or mass  Tenderness: There is no abdominal tenderness  There is no right CVA tenderness, left CVA tenderness, guarding or rebound  Musculoskeletal: Normal range of motion  General: No swelling  Right lower leg: No edema  Left lower leg: No edema  Lymphadenopathy:      Cervical: No cervical adenopathy  Skin:     General: Skin is warm and dry  Findings: No rash  Neurological:      General: No focal deficit present  Mental Status: She is alert and oriented to person, place, and time  Cranial Nerves: No cranial nerve deficit  Sensory: No sensory deficit  Motor: No weakness  Coordination: Coordination normal       Gait: Gait normal       Deep Tendon Reflexes: Reflexes are normal and symmetric  Reflexes normal    Psychiatric:         Mood and Affect: Mood normal          Behavior: Behavior normal          Thought Content:  Thought content normal          Judgment: Judgment normal            RESULTS:    No results found for this or any previous visit (from the past 1008 hour(s))  This note was created with voice recognition software  Phonic, grammatical and spelling errors may be present within the note as a result

## 2021-04-21 ENCOUNTER — APPOINTMENT (OUTPATIENT)
Dept: LAB | Facility: CLINIC | Age: 41
End: 2021-04-21
Payer: COMMERCIAL

## 2021-04-21 DIAGNOSIS — Z13.6 SCREENING FOR HEART DISEASE: ICD-10-CM

## 2021-04-21 DIAGNOSIS — F41.9 ANXIETY: ICD-10-CM

## 2021-04-21 LAB
ALBUMIN SERPL BCP-MCNC: 3.8 G/DL (ref 3.5–5)
ALP SERPL-CCNC: 52 U/L (ref 46–116)
ALT SERPL W P-5'-P-CCNC: 12 U/L (ref 12–78)
ANION GAP SERPL CALCULATED.3IONS-SCNC: 6 MMOL/L (ref 4–13)
AST SERPL W P-5'-P-CCNC: 11 U/L (ref 5–45)
BASOPHILS # BLD AUTO: 0.06 THOUSANDS/ΜL (ref 0–0.1)
BASOPHILS NFR BLD AUTO: 1 % (ref 0–1)
BILIRUB SERPL-MCNC: 1.41 MG/DL (ref 0.2–1)
BUN SERPL-MCNC: 12 MG/DL (ref 5–25)
CALCIUM SERPL-MCNC: 9.2 MG/DL (ref 8.3–10.1)
CHLORIDE SERPL-SCNC: 110 MMOL/L (ref 100–108)
CHOLEST SERPL-MCNC: 177 MG/DL (ref 50–200)
CO2 SERPL-SCNC: 23 MMOL/L (ref 21–32)
CREAT SERPL-MCNC: 0.73 MG/DL (ref 0.6–1.3)
EOSINOPHIL # BLD AUTO: 0.05 THOUSAND/ΜL (ref 0–0.61)
EOSINOPHIL NFR BLD AUTO: 1 % (ref 0–6)
ERYTHROCYTE [DISTWIDTH] IN BLOOD BY AUTOMATED COUNT: 11.6 % (ref 11.6–15.1)
GFR SERPL CREATININE-BSD FRML MDRD: 103 ML/MIN/1.73SQ M
GLUCOSE P FAST SERPL-MCNC: 88 MG/DL (ref 65–99)
HCT VFR BLD AUTO: 41.3 % (ref 34.8–46.1)
HDLC SERPL-MCNC: 47 MG/DL
HGB BLD-MCNC: 14.1 G/DL (ref 11.5–15.4)
IMM GRANULOCYTES # BLD AUTO: 0.01 THOUSAND/UL (ref 0–0.2)
IMM GRANULOCYTES NFR BLD AUTO: 0 % (ref 0–2)
LDLC SERPL CALC-MCNC: 117 MG/DL (ref 0–100)
LYMPHOCYTES # BLD AUTO: 1.92 THOUSANDS/ΜL (ref 0.6–4.47)
LYMPHOCYTES NFR BLD AUTO: 37 % (ref 14–44)
MCH RBC QN AUTO: 31.2 PG (ref 26.8–34.3)
MCHC RBC AUTO-ENTMCNC: 34.1 G/DL (ref 31.4–37.4)
MCV RBC AUTO: 91 FL (ref 82–98)
MONOCYTES # BLD AUTO: 0.35 THOUSAND/ΜL (ref 0.17–1.22)
MONOCYTES NFR BLD AUTO: 7 % (ref 4–12)
NEUTROPHILS # BLD AUTO: 2.74 THOUSANDS/ΜL (ref 1.85–7.62)
NEUTS SEG NFR BLD AUTO: 54 % (ref 43–75)
NONHDLC SERPL-MCNC: 130 MG/DL
NRBC BLD AUTO-RTO: 0 /100 WBCS
PLATELET # BLD AUTO: 219 THOUSANDS/UL (ref 149–390)
PMV BLD AUTO: 10 FL (ref 8.9–12.7)
POTASSIUM SERPL-SCNC: 4.4 MMOL/L (ref 3.5–5.3)
PROT SERPL-MCNC: 7.2 G/DL (ref 6.4–8.2)
RBC # BLD AUTO: 4.52 MILLION/UL (ref 3.81–5.12)
SODIUM SERPL-SCNC: 139 MMOL/L (ref 136–145)
TRIGL SERPL-MCNC: 64 MG/DL
WBC # BLD AUTO: 5.13 THOUSAND/UL (ref 4.31–10.16)

## 2021-04-21 PROCEDURE — 85025 COMPLETE CBC W/AUTO DIFF WBC: CPT

## 2021-04-21 PROCEDURE — 80061 LIPID PANEL: CPT

## 2021-04-21 PROCEDURE — 36415 COLL VENOUS BLD VENIPUNCTURE: CPT

## 2021-04-21 PROCEDURE — 80053 COMPREHEN METABOLIC PANEL: CPT

## 2022-04-07 DIAGNOSIS — F41.9 ANXIETY: ICD-10-CM

## 2022-04-08 ENCOUNTER — HOSPITAL ENCOUNTER (EMERGENCY)
Facility: HOSPITAL | Age: 42
Discharge: HOME | End: 2022-04-08
Attending: EMERGENCY MEDICINE | Admitting: EMERGENCY MEDICINE
Payer: COMMERCIAL

## 2022-04-08 ENCOUNTER — APPOINTMENT (EMERGENCY)
Dept: RADIOLOGY | Facility: HOSPITAL | Age: 42
End: 2022-04-08
Payer: COMMERCIAL

## 2022-04-08 VITALS
DIASTOLIC BLOOD PRESSURE: 71 MMHG | SYSTOLIC BLOOD PRESSURE: 121 MMHG | OXYGEN SATURATION: 100 % | RESPIRATION RATE: 19 BRPM | TEMPERATURE: 97.9 F | HEART RATE: 70 BPM

## 2022-04-08 DIAGNOSIS — R07.9 CHEST PAIN: Primary | ICD-10-CM

## 2022-04-08 LAB
2HR DELTA HS TROPONIN: >0 NG/L
ALBUMIN SERPL BCP-MCNC: 4 G/DL (ref 3.5–5)
ALP SERPL-CCNC: 56 U/L (ref 46–116)
ALT SERPL W P-5'-P-CCNC: 19 U/L (ref 12–78)
ANION GAP SERPL CALCULATED.3IONS-SCNC: 8 MMOL/L (ref 4–13)
AST SERPL W P-5'-P-CCNC: 14 U/L (ref 5–45)
ATRIAL RATE: 81 BPM
BASOPHILS # BLD AUTO: 0.05 THOUSANDS/ΜL (ref 0–0.1)
BASOPHILS NFR BLD AUTO: 1 % (ref 0–1)
BILIRUB SERPL-MCNC: 1.15 MG/DL (ref 0.2–1)
BUN SERPL-MCNC: 13 MG/DL (ref 5–25)
CALCIUM SERPL-MCNC: 8.8 MG/DL (ref 8.3–10.1)
CARDIAC TROPONIN I PNL SERPL HS: 2 NG/L
CARDIAC TROPONIN I PNL SERPL HS: <2 NG/L
CHLORIDE SERPL-SCNC: 103 MMOL/L (ref 100–108)
CO2 SERPL-SCNC: 26 MMOL/L (ref 21–32)
CREAT SERPL-MCNC: 0.73 MG/DL (ref 0.6–1.3)
EOSINOPHIL # BLD AUTO: 0.06 THOUSAND/ΜL (ref 0–0.61)
EOSINOPHIL NFR BLD AUTO: 1 % (ref 0–6)
ERYTHROCYTE [DISTWIDTH] IN BLOOD BY AUTOMATED COUNT: 11.9 % (ref 11.6–15.1)
GFR SERPL CREATININE-BSD FRML MDRD: 102 ML/MIN/1.73SQ M
GLUCOSE SERPL-MCNC: 80 MG/DL (ref 65–140)
HCT VFR BLD AUTO: 40.4 % (ref 34.8–46.1)
HGB BLD-MCNC: 14 G/DL (ref 11.5–15.4)
IMM GRANULOCYTES # BLD AUTO: 0.01 THOUSAND/UL (ref 0–0.2)
IMM GRANULOCYTES NFR BLD AUTO: 0 % (ref 0–2)
LYMPHOCYTES # BLD AUTO: 3.2 THOUSANDS/ΜL (ref 0.6–4.47)
LYMPHOCYTES NFR BLD AUTO: 43 % (ref 14–44)
MCH RBC QN AUTO: 31.6 PG (ref 26.8–34.3)
MCHC RBC AUTO-ENTMCNC: 34.7 G/DL (ref 31.4–37.4)
MCV RBC AUTO: 91 FL (ref 82–98)
MONOCYTES # BLD AUTO: 0.47 THOUSAND/ΜL (ref 0.17–1.22)
MONOCYTES NFR BLD AUTO: 6 % (ref 4–12)
NEUTROPHILS # BLD AUTO: 3.7 THOUSANDS/ΜL (ref 1.85–7.62)
NEUTS SEG NFR BLD AUTO: 49 % (ref 43–75)
NRBC BLD AUTO-RTO: 0 /100 WBCS
P AXIS: 58 DEGREES
PLATELET # BLD AUTO: 258 THOUSANDS/UL (ref 149–390)
PMV BLD AUTO: 9.2 FL (ref 8.9–12.7)
POTASSIUM SERPL-SCNC: 3.5 MMOL/L (ref 3.5–5.3)
PR INTERVAL: 130 MS
PROT SERPL-MCNC: 7.5 G/DL (ref 6.4–8.2)
QRS AXIS: 73 DEGREES
QRSD INTERVAL: 74 MS
QT INTERVAL: 366 MS
QTC INTERVAL: 425 MS
RBC # BLD AUTO: 4.43 MILLION/UL (ref 3.81–5.12)
SODIUM SERPL-SCNC: 137 MMOL/L (ref 136–145)
T WAVE AXIS: 47 DEGREES
VENTRICULAR RATE: 81 BPM
WBC # BLD AUTO: 7.49 THOUSAND/UL (ref 4.31–10.16)

## 2022-04-08 PROCEDURE — 99285 EMERGENCY DEPT VISIT HI MDM: CPT

## 2022-04-08 PROCEDURE — 84484 ASSAY OF TROPONIN QUANT: CPT | Performed by: PHYSICIAN ASSISTANT

## 2022-04-08 PROCEDURE — 71046 X-RAY EXAM CHEST 2 VIEWS: CPT

## 2022-04-08 PROCEDURE — 36415 COLL VENOUS BLD VENIPUNCTURE: CPT | Performed by: PHYSICIAN ASSISTANT

## 2022-04-08 PROCEDURE — 85025 COMPLETE CBC W/AUTO DIFF WBC: CPT | Performed by: PHYSICIAN ASSISTANT

## 2022-04-08 PROCEDURE — 99285 EMERGENCY DEPT VISIT HI MDM: CPT | Performed by: PHYSICIAN ASSISTANT

## 2022-04-08 PROCEDURE — 93010 ELECTROCARDIOGRAM REPORT: CPT | Performed by: INTERNAL MEDICINE

## 2022-04-08 PROCEDURE — 93005 ELECTROCARDIOGRAM TRACING: CPT

## 2022-04-08 PROCEDURE — 80053 COMPREHEN METABOLIC PANEL: CPT | Performed by: PHYSICIAN ASSISTANT

## 2022-04-08 RX ORDER — FLUOXETINE HYDROCHLORIDE 20 MG/1
CAPSULE ORAL
Qty: 90 CAPSULE | Refills: 1 | Status: SHIPPED | OUTPATIENT
Start: 2022-04-08 | End: 2022-08-08

## 2022-04-08 NOTE — ED PROVIDER NOTES
History  Chief Complaint   Patient presents with    Chest Pain     all day, L sided chest pain radiating down L arm, describes it as pressure     40 yo female with chest pain  Pressure  Started earlier this morning  Began spontaneously  Radiates to left arm  Tingling in left arm  Has this in the past  She attributes it to her anxiety  Previously on prozac but stopped taking it  Comes and goes  Not constant  No sob  Reports feeling lightheaded/off balanced today as well  No h/o cardiac disease  No significant family history  No leg swelling  No diaphoresis  History provided by:  Patient   used: No    Chest Pain  Pain location:  L chest  Pain quality: pressure    Pain radiates to:  L arm  Pain radiates to the back: no    Pain severity:  Moderate  Onset quality:  Gradual  Duration:  8 hours  Timing:  Intermittent  Progression:  Waxing and waning  Chronicity:  New  Relieved by:  Nothing  Worsened by:  Nothing tried  Ineffective treatments:  None tried  Associated symptoms: numbness (left arm)    Associated symptoms: no abdominal pain, no back pain, no cough, no fever, no palpitations, no shortness of breath and not vomiting        Prior to Admission Medications   Prescriptions Last Dose Informant Patient Reported? Taking? FLUoxetine (PROzac) 20 mg capsule   No No   Sig: TAKE 1 CAPSULE BY MOUTH EVERY DAY   medroxyPROGESTERone acetate (DEPO-PROVERA SYRINGE) 150 mg/mL injection   Yes No   Sig: INJECT 150 MG INTO THE MUSCLE EVERY 3 (THREE) MONTHS   penciclovir (DENAVIR) 1 % cream   No No   Sig: Apply topically every 2 (two) hours      Facility-Administered Medications: None       History reviewed  No pertinent past medical history  History reviewed  No pertinent surgical history      Family History   Problem Relation Age of Onset   Emaline Folds COPD Father     Hyperlipidemia Father     Asthma Father     Heart disease Maternal Grandmother     Breast cancer Maternal Grandmother     Diabetes Maternal Grandmother     Heart disease Maternal Grandfather     Diabetes Maternal Grandfather     Diabetes Paternal Grandmother      I have reviewed and agree with the history as documented  E-Cigarette/Vaping     E-Cigarette/Vaping Substances     Social History     Tobacco Use    Smoking status: Never Smoker    Smokeless tobacco: Never Used   Substance Use Topics    Alcohol use: Yes     Alcohol/week: 2 0 standard drinks     Types: 1 Glasses of wine, 1 Cans of beer per week     Comment: per week    Drug use: No       Review of Systems   Constitutional: Negative for chills and fever  HENT: Negative for ear pain and sore throat  Eyes: Negative for pain and visual disturbance  Respiratory: Negative for cough and shortness of breath  Cardiovascular: Positive for chest pain  Negative for palpitations  Gastrointestinal: Negative for abdominal pain and vomiting  Genitourinary: Negative for dysuria and hematuria  Musculoskeletal: Negative for arthralgias and back pain  Skin: Negative for color change and rash  Neurological: Positive for numbness (left arm)  Negative for seizures and syncope  All other systems reviewed and are negative  Physical Exam  Physical Exam  Vitals and nursing note reviewed  Constitutional:       General: She is not in acute distress  Appearance: She is well-developed  HENT:      Head: Normocephalic and atraumatic  Eyes:      Conjunctiva/sclera: Conjunctivae normal    Cardiovascular:      Rate and Rhythm: Normal rate and regular rhythm  Heart sounds: No murmur heard  Pulmonary:      Effort: Pulmonary effort is normal  No respiratory distress  Breath sounds: Normal breath sounds  Abdominal:      Palpations: Abdomen is soft  Tenderness: There is no abdominal tenderness  Musculoskeletal:      Cervical back: Neck supple  Skin:     General: Skin is warm and dry  Neurological:      Mental Status: She is alert           Vital Signs  ED Triage Vitals   Temperature Pulse Respirations Blood Pressure SpO2   04/08/22 1622 04/08/22 1622 04/08/22 1622 04/08/22 1622 04/08/22 1622   97 9 °F (36 6 °C) 81 18 139/79 98 %      Temp Source Heart Rate Source Patient Position - Orthostatic VS BP Location FiO2 (%)   04/08/22 1622 04/08/22 1900 04/08/22 1622 04/08/22 1622 --   Oral Monitor Sitting Left arm       Pain Score       04/08/22 1622       10 - Worst Possible Pain           Vitals:    04/08/22 1622 04/08/22 1800 04/08/22 1830 04/08/22 1900   BP: 139/79 111/62 128/64 121/71   Pulse: 81 74 69 70   Patient Position - Orthostatic VS: Sitting   Lying         Visual Acuity      ED Medications  Medications - No data to display    Diagnostic Studies  Results Reviewed     Procedure Component Value Units Date/Time    HS Troponin I 2hr [[id number]]  (Normal) Collected: 04/08/22 1917    Lab Status: Final result Specimen: Blood from Arm, Right Updated: 04/08/22 1951     hs TnI 2hr 2 ng/L      Delta 2hr hsTnI >0 ng/L     HS Troponin I 4hr [[id number]]     Lab Status: No result Specimen: Blood     HS Troponin 0hr (reflex protocol) [[id number]]  (Normal) Collected: 04/08/22 1651    Lab Status: Final result Specimen: Blood from Arm, Right Updated: 04/08/22 1719     hs TnI 0hr <2 ng/L     Comprehensive metabolic panel [[id number]]  (Abnormal) Collected: 04/08/22 1651    Lab Status: Final result Specimen: Blood from Arm, Right Updated: 04/08/22 1711     Sodium 137 mmol/L      Potassium 3 5 mmol/L      Chloride 103 mmol/L      CO2 26 mmol/L      ANION GAP 8 mmol/L      BUN 13 mg/dL      Creatinine 0 73 mg/dL      Glucose 80 mg/dL      Calcium 8 8 mg/dL      AST 14 U/L      ALT 19 U/L      Alkaline Phosphatase 56 U/L      Total Protein 7 5 g/dL      Albumin 4 0 g/dL      Total Bilirubin 1 15 mg/dL      eGFR 102 ml/min/1 73sq m     Narrative:      [place] guidelines for Chronic Kidney Disease (CKD):     Stage 1 with normal or high GFR (GFR > 90 mL/min/1 73 square meters)    Stage 2 Mild CKD (GFR = 60-89 mL/min/1 73 square meters)    Stage 3A Moderate CKD (GFR = 45-59 mL/min/1 73 square meters)    Stage 3B Moderate CKD (GFR = 30-44 mL/min/1 73 square meters)    Stage 4 Severe CKD (GFR = 15-29 mL/min/1 73 square meters)    Stage 5 End Stage CKD (GFR <15 mL/min/1 73 square meters)  Note: GFR calculation is accurate only with a steady state creatinine    CBC and differential [[id number]] Collected: 04/08/22 1651    Lab Status: Final result Specimen: Blood from Arm, Right Updated: 04/08/22 1656     WBC 7 49 Thousand/uL      RBC 4 43 Million/uL      Hemoglobin 14 0 g/dL      Hematocrit 40 4 %      MCV 91 fL      MCH 31 6 pg      MCHC 34 7 g/dL      RDW 11 9 %      MPV 9 2 fL      Platelets 429 Thousands/uL      nRBC 0 /100 WBCs      Neutrophils Relative 49 %      Immat GRANS % 0 %      Lymphocytes Relative 43 %      Monocytes Relative 6 %      Eosinophils Relative 1 %      Basophils Relative 1 %      Neutrophils Absolute 3 70 Thousands/µL      Immature Grans Absolute 0 01 Thousand/uL      Lymphocytes Absolute 3 20 Thousands/µL      Monocytes Absolute 0 47 Thousand/µL      Eosinophils Absolute 0 06 Thousand/µL      Basophils Absolute 0 05 Thousands/µL                  XR chest 2 views    (Results Pending)              Procedures  ECG 12 Lead Documentation Only    Date/Time: 4/8/2022 4:45 PM  Performed by: Ganga Galvan PA-C  Authorized by: Ganga Galvan PA-C     ECG reviewed by me, the ED Provider: yes    Patient location:  ED  Interpretation:     Interpretation: normal    Quality:     Tracing quality:  Limited by artifact  Rate:     ECG rate assessment: normal    Rhythm:     Rhythm: sinus rhythm    Ectopy:     Ectopy: none    QRS:     QRS axis:  Normal    QRS intervals:  Normal  Conduction:     Conduction: normal    ST segments:     ST segments:  Normal  T waves:     T waves: normal               ED Course             HEART Risk Score      Most Recent Value Heart Score Risk Calculator    History 0 Filed at: 04/08/2022 1956   ECG 0 Filed at: 04/08/2022 1956   Age 0 Filed at: 04/08/2022 1956   Risk Factors 0 Filed at: 04/08/2022 1956   Troponin 0 Filed at: 04/08/2022 1956   HEART Score 0 Filed at: 04/08/2022 1956                        SBIRT 20yo+      Most Recent Value   SBIRT (25 yo +)    In order to provide better care to our patients, we are screening all of our patients for alcohol and drug use  Would it be okay to ask you these screening questions? Yes Filed at: 04/08/2022 1636   Initial Alcohol Screen: US AUDIT-C     1  How often do you have a drink containing alcohol? 0 Filed at: 04/08/2022 1636   2  How many drinks containing alcohol do you have on a typical day you are drinking? 0 Filed at: 04/08/2022 1636   3a  Male UNDER 65: How often do you have five or more drinks on one occasion? 0 Filed at: 04/08/2022 1636   3b  FEMALE Any Age, or MALE 65+: How often do you have 4 or more drinks on one occassion? 0 Filed at: 04/08/2022 1636   Audit-C Score 0 Filed at: 04/08/2022 1636   IWONA: How many times in the past year have you    Used an illegal drug or used a prescription medication for non-medical reasons? Never Filed at: 04/08/2022 1636                    MDM  Number of Diagnoses or Management Options  Chest pain: new and requires workup  Diagnosis management comments: DDX including but not limited to: ACS, MI, PE, PTX, pneumonia, dissection, pleurisy, pericarditis, myocarditis, rhabdomyolysis, GI etiology  Plan: Cardiac workup       Amount and/or Complexity of Data Reviewed  Clinical lab tests: ordered and reviewed  Tests in the radiology section of CPT®: ordered and reviewed  Independent visualization of images, tracings, or specimens: yes    Risk of Complications, Morbidity, and/or Mortality  Presenting problems: moderate  Management options: low  General comments: 38 yo with chest pain  Trop negative x2  Labs unremarkable  XR negative  Unclear etiology  Could be anxiety/stress  Recommended close PCP f/u  Return parameters provided  Pt understands and agrees with plan  Patient Progress  Patient progress: stable      Disposition  Final diagnoses:   Chest pain     Time reflects when diagnosis was documented in both MDM as applicable and the Disposition within this note     Time User Action Codes Description Comment    4/8/2022  7:56 PM Saleem Tonodaniel Add [R07 9] Chest pain       ED Disposition     ED Disposition Condition Date/Time Comment    Discharge Stable Fri Apr 8, 2022  7:56 PM Delories Monday discharge to home/self care  Follow-up Information     Follow up With Specialties Details Why Tacos Hanley MD Internal Medicine Call today  [facility] [phone]            Patient's Medications   Discharge Prescriptions    No medications on file       No discharge procedures on file      PDMP Review     None          ED Provider  Electronically Signed by           Sky Rodarte PA-C  04/08/22 9550

## 2022-04-19 ENCOUNTER — TELEPHONE (OUTPATIENT)
Dept: ADMINISTRATIVE | Facility: OTHER | Age: 42
End: 2022-04-19

## 2022-04-19 NOTE — TELEPHONE ENCOUNTER
----- Message from Renetta Norris LPN sent at 7/24/0981 10:27 AM EDT -----  Regarding: Cervical Ca Screening  04/19/22 10:27 AM    Arian, our patient Irena Cm has had Pap Smear (HPV) aka Cervical Cancer Screening completed/performed  Please assist in updating the patient chart by pulling the Care Everywhere (CE) document  The date of service is 03/17/2020       Thank you,  Renetta Norris LPN  [facility]

## 2022-04-19 NOTE — TELEPHONE ENCOUNTER
----- Message from Sandy Reynoso LPN sent at 9/89/8956 10:26 AM EDT -----  Regarding: HIV Screening  04/19/22 10:26 AM    Arian, our patient Juliet Miller has had HIV completed/performed  Please assist in updating the patient chart by pulling the Care Everywhere (CE) document  The date of service is 09/04/2015       Thank you,  Sandy Reynoso LPN  [facility] Nya Glasage

## 2022-04-19 NOTE — TELEPHONE ENCOUNTER
----- Message from Warden Daquan LPN sent at 0/95/3092 10:25 AM EDT -----  Regarding: Mammogram  04/19/22 10:26 AM    Hello, our patient James Nelson has had Mammogram completed/performed  Please assist in updating the patient chart by pulling the Care Everywhere (CE) document  The date of service is 04/30/2021       Thank you,  Warden Daquan LPN  [facility] Angela Bhakta

## 2022-04-19 NOTE — TELEPHONE ENCOUNTER
Upon review of the In Basket request we were able to locate, review, and update the patient chart as requested for HIV, Mammogram and Pap Smear (HPV) aka Cervical Cancer Screening  Any additional questions or concerns should be emailed to the Practice Liaisons via [email or web address] email, please do not reply via In Basket      Thank you  Brittney Garcia

## 2022-04-20 ENCOUNTER — OFFICE VISIT (OUTPATIENT)
Dept: INTERNAL MEDICINE CLINIC | Facility: CLINIC | Age: 42
End: 2022-04-20
Payer: COMMERCIAL

## 2022-04-20 VITALS
BODY MASS INDEX: 25.26 KG/M2 | HEIGHT: 65 IN | DIASTOLIC BLOOD PRESSURE: 76 MMHG | HEART RATE: 87 BPM | SYSTOLIC BLOOD PRESSURE: 120 MMHG | TEMPERATURE: 97.5 F | WEIGHT: 151.6 LBS | OXYGEN SATURATION: 99 %

## 2022-04-20 DIAGNOSIS — Z13.6 SCREENING FOR HEART DISEASE: ICD-10-CM

## 2022-04-20 DIAGNOSIS — M79.10 MYALGIA: ICD-10-CM

## 2022-04-20 DIAGNOSIS — Z11.59 NEED FOR HEPATITIS C SCREENING TEST: ICD-10-CM

## 2022-04-20 DIAGNOSIS — Z12.31 ENCOUNTER FOR SCREENING MAMMOGRAM FOR MALIGNANT NEOPLASM OF BREAST: ICD-10-CM

## 2022-04-20 DIAGNOSIS — Z00.00 ANNUAL PHYSICAL EXAM: Primary | ICD-10-CM

## 2022-04-20 DIAGNOSIS — F41.9 ANXIETY: ICD-10-CM

## 2022-04-20 PROCEDURE — 3725F SCREEN DEPRESSION PERFORMED: CPT | Performed by: PHYSICIAN ASSISTANT

## 2022-04-20 PROCEDURE — 3008F BODY MASS INDEX DOCD: CPT | Performed by: PHYSICIAN ASSISTANT

## 2022-04-20 PROCEDURE — 99396 PREV VISIT EST AGE 40-64: CPT | Performed by: PHYSICIAN ASSISTANT

## 2022-04-20 PROCEDURE — 1036F TOBACCO NON-USER: CPT | Performed by: PHYSICIAN ASSISTANT

## 2022-04-20 RX ORDER — HYDROXYZINE HYDROCHLORIDE 25 MG/1
25 TABLET, FILM COATED ORAL EVERY 6 HOURS PRN
Qty: 30 TABLET | Refills: 0 | Status: SHIPPED | OUTPATIENT
Start: 2022-04-20

## 2022-04-20 NOTE — PATIENT INSTRUCTIONS
General medical exam remains excellent  Will check lipid profile and vitamin-D level, I will discuss results with patient when available  For increased anxiety will make available hydroxyzine 25 mg to use as necessary  Continue current medications  Patient instructed to contact me if the previous chest pain that took her to the ER increases in frequency, duration, or intensity  Schedule follow-up 1 year, sooner as needed

## 2022-04-20 NOTE — PROGRESS NOTES
Assessment/Plan:   Patient Instructions     General medical exam remains excellent  Will check lipid profile and vitamin-D level, I will discuss results with patient when available  For increased anxiety will make available hydroxyzine 25 mg to use as necessary  Continue current medications  Patient instructed to contact me if the previous chest pain that took her to the ER increases in frequency, duration, or intensity  Schedule follow-up 1 year, sooner as needed  Quality Measures: BMI Counseling: Body mass index is 25 23 kg/m²  The BMI is above normal  Exercise recommendations include exercising 3-5 times per week  Rationale for BMI follow-up plan is due to patient being overweight or obese  Depression Screening and Follow-up Plan: Patient was screened for depression during today's encounter  They screened negative with a PHQ-2 score of 2  Return in about 1 year (around 4/20/2023) for Annual physical          Diagnoses and all orders for this visit:    Annual physical exam    Anxiety  -     hydrOXYzine HCL (ATARAX) 25 mg tablet; Take 1 tablet (25 mg total) by mouth every 6 (six) hours as needed for anxiety    Need for hepatitis C screening test  -     Hepatitis C antibody; Future    Myalgia  -     Vitamin D 25 hydroxy; Future    Screening for heart disease  -     Lipid panel; Future    Encounter for screening mammogram for malignant neoplasm of breast  -     Mammo screening bilateral w 3d & cad; Future          Subjective:      Patient ID: Jeanie Arthur is a 39 y o  female  25-year-old female presents for her annual physical   On 04/08/2022 she had gone to the ER secondary to chest pain that was radiating into her shoulder and down her left arm  She has noted that her stresses increased recently  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, and has found it somewhat increasingly difficult to deal with them as they are work ethic is definitely not what her is a is    Also not what her work ethic was when she was a student  This is caused increased anxiety despite being on the fluoxetine  She does however feel the medication is still effective  She also notes she has not been as active as she usually is hand this is probably playing a role  Hepatitis C screening discussed  Patient in agreement  EMR has been reviewed, clarified, and updated with patient today  ALLERGIES:  No Known Allergies    CURRENT MEDICATIONS:    Current Outpatient Medications:     FLUoxetine (PROzac) 20 mg capsule, TAKE 1 CAPSULE BY MOUTH EVERY DAY, Disp: 90 capsule, Rfl: 1    penciclovir (DENAVIR) 1 % cream, Apply topically every 2 (two) hours, Disp: 1 5 g, Rfl: 5    hydrOXYzine HCL (ATARAX) 25 mg tablet, Take 1 tablet (25 mg total) by mouth every 6 (six) hours as needed for anxiety, Disp: 30 tablet, Rfl: 0    ACTIVE PROBLEM LIST:  Patient Active Problem List   Diagnosis    Annual physical exam    Anxiety       PAST MEDICAL HISTORY:  History reviewed  No pertinent past medical history  PAST SURGICAL HISTORY:  History reviewed  No pertinent surgical history  FAMILY HISTORY:  Family History   Problem Relation Age of Onset    COPD Father     Hyperlipidemia Father     Asthma Father     Heart disease Maternal Grandmother     Breast cancer Maternal Grandmother     Diabetes Maternal Grandmother     Heart disease Maternal Grandfather     Diabetes Maternal Grandfather     Diabetes Paternal Grandmother        SOCIAL HISTORY:  Social History     Socioeconomic History    Marital status: /Civil Union     Spouse name: Not on file    Number of children: Not on file    Years of education: Not on file    Highest education level: Not on file   Occupational History    Not on file   Tobacco Use    Smoking status: Never Smoker    Smokeless tobacco: Never Used   Substance and Sexual Activity    Alcohol use:  Yes     Alcohol/week: 2 0 standard drinks     Types: 1 Glasses of wine, 1 Cans of beer per week     Comment: per week    Drug use: No    Sexual activity: Yes     Partners: Male   Other Topics Concern    Not on file   Social History Narrative        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, brushes twice daily    Hobbies-reading     Social Determinants of Health     Financial Resource Strain: Not on file   Food Insecurity: Not on file   Transportation Needs: Not on file   Physical Activity: Not on file   Stress: Not on file   Social Connections: Not on file   Intimate Partner Violence: Not on file   Housing Stability: Not on file       Review of Systems   Constitutional: Negative for activity change, chills, fatigue and fever  HENT: Negative for congestion  Eyes: Negative for discharge  Respiratory: Negative for cough, chest tightness and shortness of breath  Cardiovascular: Positive for chest pain  Negative for palpitations and leg swelling  Gastrointestinal: Negative for abdominal pain, blood in stool, constipation, diarrhea, nausea and vomiting  Endocrine: Negative for polydipsia, polyphagia and polyuria  Genitourinary: Negative for difficulty urinating  Musculoskeletal: Positive for arthralgias (Hips intermittently)  Negative for myalgias  Skin: Negative for rash  Allergic/Immunologic: Negative for immunocompromised state  Neurological: Negative for dizziness, syncope, weakness, light-headedness and headaches  Hematological: Negative for adenopathy  Does not bruise/bleed easily  Psychiatric/Behavioral: Negative for dysphoric mood, sleep disturbance and suicidal ideas  The patient is not nervous/anxious  Objective:  Vitals:    04/20/22 0754   BP: 120/76   BP Location: Right arm   Patient Position: Sitting   Cuff Size: Adult   Pulse: 87   Temp: 97 5 °F (36 4 °C)   TempSrc: Tympanic   SpO2: 99%   Weight: 68 8 kg (151 lb 9 6 oz)   Height: 5' 5" (1 651 m)     Body mass index is 25 23 kg/m²  Physical Exam  Vitals and nursing note reviewed  Constitutional:       General: She is not in acute distress  Appearance: She is well-developed  She is not ill-appearing  HENT:      Head: Normocephalic and atraumatic  Right Ear: Tympanic membrane, ear canal and external ear normal       Left Ear: Tympanic membrane, ear canal and external ear normal       Nose: Nose normal       Mouth/Throat:      Mouth: Mucous membranes are moist       Pharynx: Oropharynx is clear  Eyes:      General: No scleral icterus  Right eye: No discharge  Left eye: No discharge  Conjunctiva/sclera: Conjunctivae normal       Pupils: Pupils are equal, round, and reactive to light  Neck:      Thyroid: No thyromegaly  Vascular: No carotid bruit  Cardiovascular:      Rate and Rhythm: Normal rate and regular rhythm  Pulses: Normal pulses  Heart sounds: Normal heart sounds  No murmur heard  Pulmonary:      Effort: Pulmonary effort is normal  No respiratory distress  Breath sounds: Normal breath sounds  Chest:      Chest wall: No tenderness  Abdominal:      General: Abdomen is flat  Bowel sounds are normal  There is no distension  Palpations: Abdomen is soft  There is no hepatomegaly, splenomegaly or mass  Tenderness: There is no abdominal tenderness  There is no right CVA tenderness, left CVA tenderness, guarding or rebound  Musculoskeletal:         General: No swelling  Normal range of motion  Cervical back: Normal range of motion and neck supple  Right lower leg: No edema  Left lower leg: No edema  Lymphadenopathy:      Cervical: No cervical adenopathy  Skin:     General: Skin is warm and dry  Findings: No rash  Comments:  2 skin tags on patient's back   Neurological:      General: No focal deficit present  Mental Status: She is alert and oriented to person, place, and time  Cranial Nerves: No cranial nerve deficit  Sensory: No sensory deficit  Motor: No weakness  Coordination: Coordination normal       Gait: Gait normal       Deep Tendon Reflexes: Reflexes are normal and symmetric  Reflexes normal    Psychiatric:         Mood and Affect: Mood normal          Behavior: Behavior normal          Thought Content:  Thought content normal          Judgment: Judgment normal            RESULTS:    Recent Results (from the past 1008 hour(s))   ECG 12 lead    Collection Time: 04/08/22  4:23 PM   Result Value Ref Range    Ventricular Rate 81 BPM    Atrial Rate 81 BPM    MS Interval 130 ms    QRSD Interval 74 ms    QT Interval 366 ms    QTC Interval 425 ms    P Axis 58 degrees    QRS Axis 73 degrees    T Wave Axis 47 degrees   CBC and differential    Collection Time: 04/08/22  4:51 PM   Result Value Ref Range    WBC 7 49 4 31 - 10 16 Thousand/uL    RBC 4 43 3 81 - 5 12 Million/uL    Hemoglobin 14 0 11 5 - 15 4 g/dL    Hematocrit 40 4 34 8 - 46 1 %    MCV 91 82 - 98 fL    MCH 31 6 26 8 - 34 3 pg    MCHC 34 7 31 4 - 37 4 g/dL    RDW 11 9 11 6 - 15 1 %    MPV 9 2 8 9 - 12 7 fL    Platelets 006 687 - 462 Thousands/uL    nRBC 0 /100 WBCs    Neutrophils Relative 49 43 - 75 %    Immat GRANS % 0 0 - 2 %    Lymphocytes Relative 43 14 - 44 %    Monocytes Relative 6 4 - 12 %    Eosinophils Relative 1 0 - 6 %    Basophils Relative 1 0 - 1 %    Neutrophils Absolute 3 70 1 85 - 7 62 Thousands/µL    Immature Grans Absolute 0 01 0 00 - 0 20 Thousand/uL    Lymphocytes Absolute 3 20 0 60 - 4 47 Thousands/µL    Monocytes Absolute 0 47 0 17 - 1 22 Thousand/µL    Eosinophils Absolute 0 06 0 00 - 0 61 Thousand/µL    Basophils Absolute 0 05 0 00 - 0 10 Thousands/µL   Comprehensive metabolic panel    Collection Time: 04/08/22  4:51 PM   Result Value Ref Range    Sodium 137 136 - 145 mmol/L    Potassium 3 5 3 5 - 5 3 mmol/L    Chloride 103 100 - 108 mmol/L    CO2 26 21 - 32 mmol/L    ANION GAP 8 4 - 13 mmol/L    BUN 13 5 - 25 mg/dL    Creatinine 0 73 0 60 - 1 30 mg/dL    Glucose 80 65 - 140 mg/dL    Calcium 8 8 8 3 - 10 1 mg/dL    AST 14 5 - 45 U/L    ALT 19 12 - 78 U/L    Alkaline Phosphatase 56 46 - 116 U/L    Total Protein 7 5 6 4 - 8 2 g/dL    Albumin 4 0 3 5 - 5 0 g/dL    Total Bilirubin 1 15 (H) 0 20 - 1 00 mg/dL    eGFR 102 ml/min/1 73sq m   HS Troponin 0hr (reflex protocol)    Collection Time: 04/08/22  4:51 PM   Result Value Ref Range    hs TnI 0hr <2 "Refer to ACS Flowchart"- see link ng/L   HS Troponin I 2hr    Collection Time: 04/08/22  7:17 PM   Result Value Ref Range    hs TnI 2hr 2 "Refer to ACS Flowchart"- see link ng/L    Delta 2hr hsTnI >0 <20 ng/L       This note was created with voice recognition software  Phonic, grammatical and spelling errors may be present within the note as a result

## 2022-05-05 ENCOUNTER — TELEPHONE (OUTPATIENT)
Dept: INTERNAL MEDICINE CLINIC | Facility: CLINIC | Age: 42
End: 2022-05-05

## 2022-05-05 DIAGNOSIS — R92.8 ABNORMAL MAMMOGRAM: Primary | ICD-10-CM

## 2022-05-05 NOTE — TELEPHONE ENCOUNTER
Patient had an abnormal mammogram on April 29  [facility] is requesting a script for a mammogram diagnostic bilateral limited and ultrasound breast bilateral limited diagnostic  She is scheduled for June 1      Please fax to [phone]

## 2022-06-22 DIAGNOSIS — B00.1 RECURRENT COLD SORES: ICD-10-CM

## 2022-06-22 RX ORDER — PENCICLOVIR 10 MG/G
CREAM TOPICAL
Qty: 1.5 G | Refills: 5 | Status: SHIPPED | OUTPATIENT
Start: 2022-06-22

## 2022-08-07 DIAGNOSIS — F41.9 ANXIETY: ICD-10-CM

## 2022-08-08 RX ORDER — FLUOXETINE HYDROCHLORIDE 20 MG/1
CAPSULE ORAL
Qty: 90 CAPSULE | Refills: 1 | Status: SHIPPED | OUTPATIENT
Start: 2022-08-08

## 2022-12-07 ENCOUNTER — TELEPHONE (OUTPATIENT)
Dept: INTERNAL MEDICINE CLINIC | Facility: CLINIC | Age: 42
End: 2022-12-07

## 2022-12-07 DIAGNOSIS — R92.8 ABNORMAL MAMMOGRAM: Primary | ICD-10-CM

## 2022-12-07 NOTE — TELEPHONE ENCOUNTER
Hello, my name is Destiny Avina  I'm calling from [facility], [address] [facility], [address]  I'm calling regarding Michael LEEHL birth date [id number]  I need a script for a right diagnostic mammogram  I need it for diagnosis code R [id number] and if you could please fax it to [phone] if you need to reach me back, my number is [phone]  Thank you   Have a wonderful     Transfer of Kathia

## 2022-12-12 ENCOUNTER — TELEPHONE (OUTPATIENT)
Dept: ADMINISTRATIVE | Facility: OTHER | Age: 42
End: 2022-12-12

## 2022-12-12 NOTE — TELEPHONE ENCOUNTER
----- Message from Marquita Varner sent at 12/9/2022  7:46 AM EST -----  Regarding: mammo  12/09/22 7:46 AM    Hello, our patient attached above has had Mammogram completed/performed  Please assist in updating the patient chart by pulling the Care Everywhere (CE) document  The date of service is 12/8/22       Thank you,  Dominick NELSON [facility]

## 2022-12-12 NOTE — TELEPHONE ENCOUNTER
Upon review of the In Basket request we were able to locate, review, and update the patient chart as requested for Mammogram     Any additional questions or concerns should be emailed to the Practice Liaisons via the appropriate education email address, please do not reply via In Basket      Thank you  Kevin Cannon

## 2023-04-25 ENCOUNTER — OFFICE VISIT (OUTPATIENT)
Dept: INTERNAL MEDICINE CLINIC | Facility: CLINIC | Age: 43
End: 2023-04-25

## 2023-04-25 ENCOUNTER — TELEPHONE (OUTPATIENT)
Dept: ADMINISTRATIVE | Facility: OTHER | Age: 43
End: 2023-04-25

## 2023-04-25 VITALS
RESPIRATION RATE: 12 BRPM | HEIGHT: 65 IN | SYSTOLIC BLOOD PRESSURE: 102 MMHG | OXYGEN SATURATION: 100 % | HEART RATE: 80 BPM | DIASTOLIC BLOOD PRESSURE: 80 MMHG | WEIGHT: 150 LBS | BODY MASS INDEX: 24.99 KG/M2

## 2023-04-25 DIAGNOSIS — Z13.6 SCREENING FOR HEART DISEASE: ICD-10-CM

## 2023-04-25 DIAGNOSIS — Z00.00 ANNUAL PHYSICAL EXAM: Primary | ICD-10-CM

## 2023-04-25 DIAGNOSIS — F41.9 ANXIETY: ICD-10-CM

## 2023-04-25 DIAGNOSIS — Z11.59 NEED FOR HEPATITIS C SCREENING TEST: ICD-10-CM

## 2023-04-25 NOTE — PATIENT INSTRUCTIONS
General medical exam remains excellent  Continue current medication  Have labs done and I will discuss results with you when available  Can give trial of OTC Astelin nasal spray 1 spray each nostril twice daily as demonstrated and instructed for allergies  Recommend follow-up 1 year, sooner as needed  Wellness Visit for Adults   AMBULATORY CARE:   A wellness visit  is when you see your healthcare provider to get screened for health problems  Your healthcare provider will also give you advice on how to stay healthy  Write down your questions so you remember to ask them  Ask your healthcare provider how often you should have a wellness visit  What happens at a wellness visit:  Your healthcare provider will ask about your health, and your family history of health problems  This includes high blood pressure, heart disease, and cancer  He or she will ask if you have symptoms that concern you, if you smoke, and about your mood  You may also be asked about your intake of medicines, supplements, food, and alcohol  Any of the following may be done: Your weight  will be checked  Your height may also be checked so your body mass index (BMI) can be calculated  Your BMI shows if you are at a healthy weight  Your blood pressure  and heart rate will be checked  Your temperature may also be checked  Blood and urine tests  may be done  Blood tests may be done to check your cholesterol levels  Abnormal cholesterol levels increase your risk for heart disease and stroke  You may also need a blood or urine test to check for diabetes if you are at increased risk  Urine tests may be done to look for signs of an infection or kidney disease  A physical exam  includes checking your heartbeat and lungs with a stethoscope  Your healthcare provider may also check your skin to look for sun damage  Screening tests  may be recommended  A screening test is done to check for diseases that may not cause symptoms   The screening tests you may need depend on your age, gender, family history, and lifestyle habits  For example, colorectal screening may be recommended if you are 48years old or older  Screening tests you need if you are a woman:   A Pap smear  is used to screen for cervical cancer  Pap smears are usually done every 3 to 5 years depending on your age  You may need them more often if you have had abnormal Pap smear test results in the past  Ask your healthcare provider how often you should have a Pap smear  A mammogram  is an x-ray of your breasts to screen for breast cancer  Experts recommend mammograms every 2 years starting at age 48 years  You may need a mammogram at age 52 years or younger if you have an increased risk for breast cancer  Talk to your healthcare provider about when you should start having mammograms and how often you need them  Vaccines you may need:   Get an influenza vaccine  every year  The influenza vaccine protects you from the flu  Several types of viruses cause the flu  The viruses change over time, so new vaccines are made each year  Get a tetanus-diphtheria (Td) booster vaccine  every 10 years  This vaccine protects you against tetanus and diphtheria  Tetanus is a severe infection that may cause painful muscle spasms and lockjaw  Diphtheria is a severe bacterial infection that causes a thick covering in the back of your mouth and throat  Get a human papillomavirus (HPV) vaccine  if you are female and aged 23 to 32 or male 23 to 24 and never received it  This vaccine protects you from HPV infection  HPV is the most common infection spread by sexual contact  HPV may also cause vaginal, penile, and anal cancers  Get a pneumococcal vaccine  if you are aged 72 years or older  The pneumococcal vaccine is an injection given to protect you from pneumococcal disease  Pneumococcal disease is an infection caused by pneumococcal bacteria   The infection may cause pneumonia, meningitis, or an ear infection  Get a shingles vaccine  if you are 60 or older, even if you have had shingles before  The shingles vaccine is an injection to protect you from the varicella-zoster virus  This is the same virus that causes chickenpox  Shingles is a painful rash that develops in people who had chickenpox or have been exposed to the virus  How to eat healthy:  My Plate is a model for planning healthy meals  It shows the types and amounts of foods that should go on your plate  Fruits and vegetables make up about half of your plate, and grains and protein make up the other half  A serving of dairy is included on the side of your plate  The amount of calories and serving sizes you need depends on your age, gender, weight, and height  Examples of healthy foods are listed below:  Eat a variety of vegetables  such as dark green, red, and orange vegetables  You can also include canned vegetables low in sodium (salt) and frozen vegetables without added butter or sauces  Eat a variety of fresh fruits , canned fruit in 100% juice, frozen fruit, and dried fruit  Include whole grains  At least half of the grains you eat should be whole grains  Examples include whole-wheat bread, wheat pasta, brown rice, and whole-grain cereals such as oatmeal     Eat a variety of protein foods such as seafood (fish and shellfish), lean meat, and poultry without skin (turkey and chicken)  Examples of lean meats include pork leg, shoulder, or tenderloin, and beef round, sirloin, tenderloin, and extra lean ground beef  Other protein foods include eggs and egg substitutes, beans, peas, soy products, nuts, and seeds  Choose low-fat dairy products such as skim or 1% milk or low-fat yogurt, cheese, and cottage cheese  Limit unhealthy fats  such as butter, hard margarine, and shortening  Exercise:  Exercise at least 30 minutes per day on most days of the week  Some examples of exercise include walking, biking, dancing, and swimming  You can also fit in more physical activity by taking the stairs instead of the elevator or parking farther away from stores  Include muscle strengthening activities 2 days each week  Regular exercise provides many health benefits  It helps you manage your weight, and decreases your risk for type 2 diabetes, heart disease, stroke, and high blood pressure  Exercise can also help improve your mood  Ask your healthcare provider about the best exercise plan for you  General health and safety guidelines:   Do not smoke  Nicotine and other chemicals in cigarettes and cigars can cause lung damage  Ask your healthcare provider for information if you currently smoke and need help to quit  E-cigarettes or smokeless tobacco still contain nicotine  Talk to your healthcare provider before you use these products  Limit alcohol  A drink of alcohol is 12 ounces of beer, 5 ounces of wine, or 1½ ounces of liquor  Lose weight, if needed  Being overweight increases your risk of certain health conditions  These include heart disease, high blood pressure, type 2 diabetes, and certain types of cancer  Protect your skin  Do not sunbathe or use tanning beds  Use sunscreen with a SPF 15 or higher  Apply sunscreen at least 15 minutes before you go outside  Reapply sunscreen every 2 hours  Wear protective clothing, hats, and sunglasses when you are outside  Drive safely  Always wear your seatbelt  Make sure everyone in your car wears a seatbelt  A seatbelt can save your life if you are in an accident  Do not use your cell phone when you are driving  This could distract you and cause an accident  Pull over if you need to make a call or send a text message  Practice safe sex  Use latex condoms if are sexually active and have more than one partner  Your healthcare provider may recommend screening tests for sexually transmitted infections (STIs)  Wear helmets, lifejackets, and protective gear    Always wear a helmet when you ride a bike or motorcycle, go skiing, or play sports that could cause a head injury  Wear protective equipment when you play sports  Wear a lifejacket when you are on a boat or doing water sports  © Copyright Cristela Guzman 2022 Information is for End User's use only and may not be sold, redistributed or otherwise used for commercial purposes  The above information is an  only  It is not intended as medical advice for individual conditions or treatments  Talk to your doctor, nurse or pharmacist before following any medical regimen to see if it is safe and effective for you

## 2023-04-25 NOTE — TELEPHONE ENCOUNTER
Upon review of the In Basket request we have found/obtained the documentation  After careful review of the document we are unable to complete this request for Mammogram because the documentation does not have the result(s) needed to close the requested care gap(s)  This was not a bilateral exam     Any additional questions or concerns should be emailed to the Practice Liaisons via the appropriate education email address, please do not reply via In Basket      Thank you  Edmundo Mills

## 2023-04-25 NOTE — TELEPHONE ENCOUNTER
----- Message from Vickie Erickson LPN sent at 8/22/2663 12:44 PM EDT -----  Regarding: Mammogram  04/24/23 12:44 PM    Hello, our patient No patient name on file  has had Mammogram completed/performed  Please assist in updating the patient chart by pulling the Care Everywhere (CE) document  The date of service is 12/08/2022       Thank you,  Vickie Erickson LPN  [facility]

## 2023-06-15 ENCOUNTER — TELEPHONE (OUTPATIENT)
Dept: OTHER | Facility: OTHER | Age: 43
End: 2023-06-15

## 2023-06-15 NOTE — TELEPHONE ENCOUNTER
Ritesh Schmitt from [facility] is calling office because  patient is coming into office tomorrow and a Rx order for a   bilateral diagnostic mammogram code is going to be R92 8 in to read exactly as seen and faxed over to office       Fax #[phone]

## 2023-06-16 DIAGNOSIS — R92.8 ABNORMAL MAMMOGRAM: Primary | ICD-10-CM

## 2023-06-16 NOTE — TELEPHONE ENCOUNTER
Printed mammogram order and the u/s of the breast that was there    Faxed both orders to the number provided

## 2023-12-01 ENCOUNTER — TELEPHONE (OUTPATIENT)
Dept: INTERNAL MEDICINE CLINIC | Facility: CLINIC | Age: 43
End: 2023-12-01

## 2023-12-01 NOTE — TELEPHONE ENCOUNTER
Yes, hi, this is Kai Swan calling from the [facility]. We have a mutual patient with Doctor Torin Alvarado. We have patient's name is JUNE and her first name is Sherrie Burgos. Her YOB: 1980 so again that the spelling is JUNE. She is coming in Monday for a six month follow up right diagnostic mammogram. It's a follow up to an abnormal finding on breast imaging R 92.8. If you could send over a right diagnostic mammogram with R 92.8 and the fax number here is [phone], [phone], that's [phone]. Thank you very much. Have a wonderful day. You can call [phone] and ask for Kai Swan if you have any questions.  tamiko Carrillo.

## 2023-12-03 ENCOUNTER — OFFICE VISIT (OUTPATIENT)
Dept: URGENT CARE | Facility: CLINIC | Age: 43
End: 2023-12-03
Payer: COMMERCIAL

## 2023-12-03 VITALS
SYSTOLIC BLOOD PRESSURE: 114 MMHG | RESPIRATION RATE: 16 BRPM | WEIGHT: 159.38 LBS | BODY MASS INDEX: 26.52 KG/M2 | TEMPERATURE: 97.4 F | OXYGEN SATURATION: 97 % | HEART RATE: 92 BPM | DIASTOLIC BLOOD PRESSURE: 72 MMHG

## 2023-12-03 DIAGNOSIS — J01.00 ACUTE MAXILLARY SINUSITIS, RECURRENCE NOT SPECIFIED: Primary | ICD-10-CM

## 2023-12-03 DIAGNOSIS — H65.02 ACUTE SEROUS OTITIS MEDIA OF LEFT EAR, RECURRENCE NOT SPECIFIED: ICD-10-CM

## 2023-12-03 LAB — S PYO AG THROAT QL: NEGATIVE

## 2023-12-03 PROCEDURE — 87880 STREP A ASSAY W/OPTIC: CPT | Performed by: ORTHOPAEDIC SURGERY

## 2023-12-03 PROCEDURE — 99203 OFFICE O/P NEW LOW 30 MIN: CPT | Performed by: ORTHOPAEDIC SURGERY

## 2023-12-03 RX ORDER — BENZONATATE 200 MG/1
200 CAPSULE ORAL 3 TIMES DAILY PRN
Qty: 20 CAPSULE | Refills: 0 | Status: SHIPPED | OUTPATIENT
Start: 2023-12-03 | End: 2023-12-03

## 2023-12-03 RX ORDER — AMOXICILLIN AND CLAVULANATE POTASSIUM 875; 125 MG/1; MG/1
1 TABLET, FILM COATED ORAL EVERY 12 HOURS SCHEDULED
Qty: 20 TABLET | Refills: 0 | Status: SHIPPED | OUTPATIENT
Start: 2023-12-03 | End: 2023-12-13

## 2023-12-03 NOTE — PATIENT INSTRUCTIONS
Your rapid strep was negative we will send a culture to confirm. Otiits media on Left ear appreciated on exam.   Stay well-hydrated  Tylenol or Ibuprofen for fever or pain  Warm water gargles with salt  Start antibiotics as directed. Fluid In The Ear (Serous Otitis Media)   WHAT YOU NEED TO KNOW:   LILLIANA is fluid trapped in the middle of your ear behind your eardrum. This condition usually develops without signs or symptoms of an ear infection. Serous otitis media may be caused by an upper respiratory infection or allergies. It is most common in the fall and early spring. DISCHARGE INSTRUCTIONS:   Call your doctor if:   You develop severe ear pain. The outside of your ear is red or swollen. You have fluid coming from your ear. You have questions or concerns about your condition or care. How to stay healthy:   Wash your hands often throughout the day. Use soap and water. Rub your soapy hands together, lacing your fingers, for at least 20 seconds. Rinse with warm, running water. Dry your hands with a clean towel or paper towel. Use hand  that contains alcohol if soap and water are not available. Teach children how to wash their hands and use hand . Avoid people who are sick. Some germs are easily and quickly spread through contact. Follow up with your doctor as directed:  Write down your questions so you remember to ask them during your visits. © Copyright Monse Jewwei 2023 Information is for End User's use only and may not be sold, redistributed or otherwise used for commercial purposes. The above information is an  only. It is not intended as medical advice for individual conditions or treatments. Talk to your doctor, nurse or pharmacist before following any medical regimen to see if it is safe and effective for you.   Augmentin   tab BID x 10 days

## 2023-12-03 NOTE — PROGRESS NOTES
[facility]        NAME: Cheli Mcdonald is a 37 y.o. female  : 1980    MRN: [id number]  DATE: December 3, 2023  TIME: 10:30 AM    Assessment and Plan   Acute maxillary sinusitis, recurrence not specified [J01.00]  1. Acute maxillary sinusitis, recurrence not specified  amoxicillin-clavulanate (AUGMENTIN) 875-125 mg per tablet      2. Acute serous otitis media of left ear, recurrence not specified  POCT rapid strepA    amoxicillin-clavulanate (AUGMENTIN) 875-125 mg per tablet    DISCONTINUED: benzonatate (TESSALON) 200 MG capsule            Patient Instructions     Patient Instructions   Your rapid strep was negative we will send a culture to confirm. Otiits media on Left ear appreciated on exam.   Stay well-hydrated  Tylenol or Ibuprofen for fever or pain  Warm water gargles with salt    Start Fluid In The Ear (Serous Otitis Media)   WHAT YOU NEED TO KNOW:   LILLIANA is fluid trapped in the middle of your ear behind your eardrum. This condition usually develops without signs or symptoms of an ear infection. Serous otitis media may be caused by an upper respiratory infection or allergies. It is most common in the fall and early spring. DISCHARGE INSTRUCTIONS:   Call your doctor if:   You develop severe ear pain. The outside of your ear is red or swollen. You have fluid coming from your ear. You have questions or concerns about your condition or care. How to stay healthy:   Wash your hands often throughout the day. Use soap and water. Rub your soapy hands together, lacing your fingers, for at least 20 seconds. Rinse with warm, running water. Dry your hands with a clean towel or paper towel. Use hand  that contains alcohol if soap and water are not available. Teach children how to wash their hands and use hand . Avoid people who are sick. Some germs are easily and quickly spread through contact.     Follow up with your doctor as directed:  Write down your questions so you remember to ask them during your visits. © Copyright Rose Marie De Dios 2023 Information is for End User's use only and may not be sold, redistributed or otherwise used for commercial purposes. The above information is an  only. It is not intended as medical advice for individual conditions or treatments. Talk to your doctor, nurse or pharmacist before following any medical regimen to see if it is safe and effective for you. Augmentin   tab BID x 10 days      Follow up with PCP in 3-5 days. Proceed to  ER if symptoms worsen. Chief Complaint     Chief Complaint   Patient presents with    Facial Pain     Post nasal drip, sinus pressure and ear pressure for a few weeks. Losing voice in the morning for 2-3 days, throat "feels "weird". No fever or chills. Taking ibuprofen for sinus and ear pain. History of Present Illness   Pt is 37 y.o. F presenting today with sore throat, trouble swallowing, cough, sob, congestion, ear and neck pain for a week. Patient notes over the last 2-3 days she has been losing her voice in the morning. Denies fevers, chills, abdominal pain, N/V/D. She has had sick contacts as she is . She has tried NSAIDs for the symptoms. The treatment provided some relief. Review of Systems   Review of Systems   HENT:  Positive for congestion, ear pain, postnasal drip, sore throat and trouble swallowing. Negative for drooling and ear discharge. Respiratory:  Positive for cough and shortness of breath (with cough). Negative for stridor. Gastrointestinal:  Negative for abdominal pain, diarrhea and vomiting. Genitourinary:  Negative for flank pain. Musculoskeletal:  Positive for myalgias and neck pain. Neurological:  Positive for headaches.          Current Medications       Current Outpatient Medications:     amoxicillin-clavulanate (AUGMENTIN) 875-125 mg per tablet, Take 1 tablet by mouth every 12 (twelve) hours for 10 days, Disp: 20 tablet, Rfl: 0    FLUoxetine (PROzac) 20 mg capsule, TAKE 1 CAPSULE BY MOUTH EVERY DAY, Disp: 90 capsule, Rfl: 0    hydrOXYzine HCL (ATARAX) 25 mg tablet, Take 1 tablet (25 mg total) by mouth every 6 (six) hours as needed for anxiety, Disp: 30 tablet, Rfl: 0    penciclovir (DENAVIR) 1 % cream, Apply topically every 2 (two) hours (Patient not taking: Reported on 4/25/2023), Disp: 1.5 g, Rfl: 5    Current Allergies     Allergies as of 12/03/2023    (No Known Allergies)            The following portions of the patient's history were reviewed and updated as appropriate: allergies, current medications, past family history, past medical history, past social history, past surgical history and problem list.     Past Medical History:   Diagnosis Date    Anxiety        History reviewed. No pertinent surgical history. Family History   Problem Relation Age of Onset    COPD Father     Hyperlipidemia Father     Asthma Father     Heart disease Maternal Grandmother     Breast cancer Maternal Grandmother     Diabetes Maternal Grandmother     Heart disease Maternal Grandfather     Diabetes Maternal Grandfather     Diabetes Paternal Grandmother          Medications have been verified. Objective   /72   Pulse 92   Temp (!) 97.4 °F (36.3 °C)   Resp 16   Wt 72.3 kg (159 lb 6 oz)   SpO2 97%   BMI 26.52 kg/m²        Physical Exam     Physical Exam  Vitals and nursing note reviewed. Constitutional:       General: She is not in acute distress. Appearance: Normal appearance. She is normal weight. She is not ill-appearing. HENT:      Head: Normocephalic and atraumatic. Right Ear: Ear canal normal. There is mastoid tenderness. Tympanic membrane is erythematous. Left Ear: Ear canal normal. There is mastoid tenderness. Tympanic membrane is erythematous and bulging. Nose: Congestion and rhinorrhea present. Mouth/Throat:      Mouth: Mucous membranes are moist.      Pharynx: Oropharynx is clear. Posterior oropharyngeal erythema present. Comments: Posterior oropharyngeal erythema present and oropharyngeal exudate on Left side. No pharyngeal swelling   Eyes:      General:         Right eye: No discharge. Extraocular Movements: Extraocular movements intact. Pupils: Pupils are equal, round, and reactive to light. Cardiovascular:      Rate and Rhythm: Normal rate and regular rhythm. Pulses: Normal pulses. Heart sounds: Normal heart sounds. Pulmonary:      Effort: Pulmonary effort is normal. No respiratory distress. Breath sounds: Normal breath sounds. No stridor. No wheezing, rhonchi or rales. Chest:      Chest wall: No tenderness. Abdominal:      General: Abdomen is flat. Bowel sounds are normal. There is no distension. Palpations: Abdomen is soft. Tenderness: There is no abdominal tenderness. There is no guarding. Musculoskeletal:         General: Normal range of motion. Cervical back: No rigidity or tenderness. Lymphadenopathy:      Cervical: No cervical adenopathy. Skin:     General: Skin is warm and dry. Capillary Refill: Capillary refill takes less than 2 seconds. Neurological:      General: No focal deficit present. Mental Status: She is alert and oriented to person, place, and time. Psychiatric:         Mood and Affect: Mood normal.         Behavior: Behavior normal.         Thought Content: Thought content normal.         Judgment: Judgment normal.               nswers submitted by the patient for this visit:  Sore Throat Questionnaire (Submitted on 12/3/2023)  Chief Complaint: Sore throat  Chronicity: new  Onset: in the past 7 days  Progression since onset: waxing and waning  Pain worse on: left  Fever: no fever  Pain - numeric: 3/10  hoarse voice: Yes  plugged ear sensation: Yes  swollen glands: Yes  strep: Yes  mono:  No

## 2023-12-04 ENCOUNTER — TELEPHONE (OUTPATIENT)
Dept: INTERNAL MEDICINE CLINIC | Facility: CLINIC | Age: 43
End: 2023-12-04

## 2023-12-04 DIAGNOSIS — R92.8 ABNORMAL MAMMOGRAM: Primary | ICD-10-CM

## 2023-12-04 NOTE — TELEPHONE ENCOUNTER
Yes, hi, this is Jayne Ramirez calling from the [facility]. Pt is coming in Monday for a six month follow up right diagnostic mammogram. It's a follow up to an abnormal finding on breast imaging R 92.8. If you could send over a right diagnostic mammogram with R 92.8 and the fax number here is [phone], [phone], that's [phone]. Thank you very much. Have a wonderful day. You can call [phone] and ask for Jayne Ramirez if you have any questions.  tamiko Carrillo.

## 2023-12-28 DIAGNOSIS — F41.9 ANXIETY: ICD-10-CM

## 2023-12-28 RX ORDER — FLUOXETINE HYDROCHLORIDE 20 MG/1
20 CAPSULE ORAL DAILY
Qty: 90 CAPSULE | Refills: 0 | Status: SHIPPED | OUTPATIENT
Start: 2023-12-28 | End: 2023-12-28 | Stop reason: CLARIF

## 2023-12-28 RX ORDER — FLUOXETINE HYDROCHLORIDE 20 MG/1
20 CAPSULE ORAL DAILY
Qty: 90 CAPSULE | Refills: 0 | OUTPATIENT
Start: 2023-12-28

## 2024-01-17 ENCOUNTER — OFFICE VISIT (OUTPATIENT)
Dept: PODIATRY | Facility: CLINIC | Age: 44
End: 2024-01-17
Payer: COMMERCIAL

## 2024-01-17 VITALS
DIASTOLIC BLOOD PRESSURE: 102 MMHG | WEIGHT: 159 LBS | BODY MASS INDEX: 26.49 KG/M2 | SYSTOLIC BLOOD PRESSURE: 144 MMHG | HEART RATE: 74 BPM | HEIGHT: 65 IN

## 2024-01-17 DIAGNOSIS — M79.675 GREAT TOE PAIN, LEFT: Primary | ICD-10-CM

## 2024-01-17 DIAGNOSIS — L60.0 INGROWN TOENAIL OF LEFT FOOT: ICD-10-CM

## 2024-01-17 PROCEDURE — 99203 OFFICE O/P NEW LOW 30 MIN: CPT | Performed by: STUDENT IN AN ORGANIZED HEALTH CARE EDUCATION/TRAINING PROGRAM

## 2024-01-17 PROCEDURE — 11730 AVULSION NAIL PLATE SIMPLE 1: CPT | Performed by: STUDENT IN AN ORGANIZED HEALTH CARE EDUCATION/TRAINING PROGRAM

## 2024-01-17 RX ORDER — LIDOCAINE HYDROCHLORIDE 10 MG/ML
3 INJECTION, SOLUTION INFILTRATION; PERINEURAL ONCE
Status: COMPLETED | OUTPATIENT
Start: 2024-01-17 | End: 2024-01-17

## 2024-01-17 RX ADMIN — LIDOCAINE HYDROCHLORIDE 3 ML: 10 INJECTION, SOLUTION INFILTRATION; PERINEURAL at 11:08

## 2024-01-17 NOTE — PROGRESS NOTES
Assessment/Plan:    No problem-specific Assessment & Plan notes found for this encounter.       Diagnoses and all orders for this visit:    Great toe pain, left  -     lidocaine (XYLOCAINE) 1 % injection 3 mL    Ingrown toenail of left foot  -     lidocaine (XYLOCAINE) 1 % injection 3 mL      Plan:     - Patient presents with ingrown toenail on the (medial/lateral) border of (left) hallux with fungal toenail.  Recommended total nail avulsion, procedure was performed as detailed below     - The procedure, anesthesia, complications and alternative care were explained in great detail. No warranties or guarantees were given as to the outcome of the procedure. Verbal consent was obtained from the patient. 3cc of 1% lidocaine plain was injected in to the left hallux following sterile alcohol prep.  The toe was prepped in sterile fashion. Under sterile conditions the (total) nail plate was removed.  43-year-old female with past medical history as below presents for evaluation of left A dry sterile dressing with antibiotic ointment was applied to the surgical site.  Home care instructions were given to the patient including decreased activity, ice and OTC pain medication as needed for 3 days. Patient will also perform daily dressing changes until the surgical site is fully healed. Patient tolerated the procedure well and with no complications.       - Monitor for infection: pus (thick yellow drainage), redness tracking up the foot, fever, nausea, vomiting, fever, chills. If any of these issues arise, call clinic immediately or go to urgent care or emergency room to see provider     - The patient will return in 10 days for follow-up.        Subjective:      Patient ID: Thea Ramirez is a 43 y.o. female.    43-year-old female with past medical history as below presents for evaluation of left big toe pain secondary to an ingrown toenail.  Patient reports she is having significant discomfort throughout her toenail plate with  "pressure specifically wearing shoes and socks.  She has not noted any drainage coming from it.  She also reports discoloration and thickness to the toenail.  No other complaints at this visit.        The following portions of the patient's history were reviewed and updated as appropriate: She  has a past medical history of Anxiety.  She   Patient Active Problem List    Diagnosis Date Noted    Anxiety 09/14/2018    Annual physical exam 08/20/2018     She  has no past surgical history on file.  Current Outpatient Medications   Medication Sig Dispense Refill    hydrOXYzine HCL (ATARAX) 25 mg tablet Take 1 tablet (25 mg total) by mouth every 6 (six) hours as needed for anxiety 30 tablet 0    penciclovir (DENAVIR) 1 % cream Apply topically every 2 (two) hours (Patient not taking: Reported on 4/25/2023) 1.5 g 5     No current facility-administered medications for this visit.   .    Review of Systems   All other systems reviewed and are negative.        Objective:      BP (!) 144/102 (BP Location: Right arm, Patient Position: Sitting, Cuff Size: Large)   Pulse 74   Ht 5' 5\" (1.651 m)   Wt 72.1 kg (159 lb)   BMI 26.46 kg/m²          Physical Exam  Vitals reviewed.   Cardiovascular:      Pulses:           Dorsalis pedis pulses are 2+ on the left side.        Posterior tibial pulses are 2+ on the left side.   Feet:      Left foot:      Toenail Condition: Left toenails are ingrown. Fungal disease present.     Comments: Left hallux toenail plate discoloration yellowish in appearance.  Pain with palpation noted throughout the nail plate medial lateral and centrally.  No active drainage erythema or edema noted to the toe.  Ingrown toenail signs present.      Nail removal    Date/Time: 1/17/2024 11:00 AM    Performed by: Aym Damon DPM  Authorized by: Amy Damon DPM    Patient location:  ClinicUniversal Protocol:  Consent: Verbal consent obtained.  Risks and benefits: risks, benefits and alternatives were " "discussed  Consent given by: patient  Time out: Immediately prior to procedure a \"time out\" was called to verify the correct patient, procedure, equipment, support staff and site/side marked as required.  Patient understanding: patient states understanding of the procedure being performed  Required items: required blood products, implants, devices, and special equipment available    Location:     Foot:  L big toe  Pre-procedure details:     Skin preparation:  Betadine and alcohol  Anesthesia (see MAR for exact dosages):     Anesthesia method:  Local infiltration    Local anesthetic:  Lidocaine 1% w/o epi (3cc)  Nail Removal:     Nail removed:  Complete  Post-procedure details:     Dressing:  4x4 sterile gauze, gauze roll and antibiotic ointment    Patient tolerance of procedure:  Tolerated well, no immediate complications        "

## 2024-01-19 ENCOUNTER — TELEPHONE (OUTPATIENT)
Age: 44
End: 2024-01-19

## 2024-01-19 NOTE — TELEPHONE ENCOUNTER
Caller: Thea Ramirez    Doctor/Office: Dr. Damon/Elton    #: [phone]    Escalation: Care Patient has bottom of layer of gauze stuck to her nail removal site. Please call and advise how she can get this off. Thank you

## 2024-01-30 ENCOUNTER — OFFICE VISIT (OUTPATIENT)
Dept: PODIATRY | Facility: CLINIC | Age: 44
End: 2024-01-30
Payer: COMMERCIAL

## 2024-01-30 VITALS
HEART RATE: 87 BPM | BODY MASS INDEX: 26.46 KG/M2 | DIASTOLIC BLOOD PRESSURE: 84 MMHG | WEIGHT: 159 LBS | SYSTOLIC BLOOD PRESSURE: 116 MMHG

## 2024-01-30 DIAGNOSIS — B35.1 TINEA UNGUIUM: Primary | ICD-10-CM

## 2024-01-30 PROCEDURE — 99213 OFFICE O/P EST LOW 20 MIN: CPT | Performed by: STUDENT IN AN ORGANIZED HEALTH CARE EDUCATION/TRAINING PROGRAM

## 2024-01-30 NOTE — PROGRESS NOTES
Assessment/Plan:    No problem-specific Assessment & Plan notes found for this encounter.       Diagnoses and all orders for this visit:    Tinea unguium  -     ciclopirox (PENLAC) 8 % solution; Apply topically daily at bedtime Apply Penlac at bedtime to the affected toenails daily, wipe off the Penlac from the toenails with acetone or rubbing alcohol on the 7th day and restart the cycle.      Plan:     - diagnosis and treatments were discussed with patient. I offered patient various treatment options including topical OTC and prescription anti-fungal topicals and oral anti-fungal medications. I also explained patient risks and benefits of each treatment. Patient opted for topical anti-fungal. Rx Panlac solution and went over the application protocol. Patient agrees with plan and understands. All questions and concerns were addressed.   - return in 3-4 months   - Call if any questions       Subjective:      Patient ID: Thea Ramirez is a 43 y.o. female.    43-year-old female with past medical history as below presents status post left big toenail avulsion, patient reports she is doing well without any discomfort to the toenail.  She also presents with new onset of right second digit toenail discoloration and thickened.  Patient reports she is interested in fungal toenail treatment options.  She has no pain at this time.  No other complaints.        The following portions of the patient's history were reviewed and updated as appropriate: She  has a past medical history of Anxiety.  She   Patient Active Problem List    Diagnosis Date Noted    Anxiety 09/14/2018    Annual physical exam 08/20/2018     She  has no past surgical history on file.  Current Outpatient Medications   Medication Sig Dispense Refill    ciclopirox (PENLAC) 8 % solution Apply topically daily at bedtime Apply Penlac at bedtime to the affected toenails daily, wipe off the Penlac from the toenails with acetone or rubbing alcohol on the 7th day and  restart the cycle. 6 mL 3    hydrOXYzine HCL (ATARAX) 25 mg tablet Take 1 tablet (25 mg total) by mouth every 6 (six) hours as needed for anxiety 30 tablet 0    penciclovir (DENAVIR) 1 % cream Apply topically every 2 (two) hours (Patient not taking: Reported on 4/25/2023) 1.5 g 5     No current facility-administered medications for this visit.   .    Review of Systems   All other systems reviewed and are negative.        Objective:      /84 (BP Location: Left arm, Patient Position: Sitting, Cuff Size: Standard)   Pulse 87   Wt 72.1 kg (159 lb)   BMI 26.46 kg/m²          Physical Exam  Vitals reviewed.   Feet:      Right foot:      Toenail Condition: Fungal disease present.     Comments: Left hallux total nail avulsion site has healed.  Right second digit toenail discolored yellowish in appearance with thickened subungual debris noted consistent with onychomycosis.

## 2024-02-09 ENCOUNTER — APPOINTMENT (OUTPATIENT)
Dept: LAB | Facility: CLINIC | Age: 44
End: 2024-02-09
Payer: COMMERCIAL

## 2024-02-09 DIAGNOSIS — F41.9 ANXIETY: ICD-10-CM

## 2024-02-09 DIAGNOSIS — Z11.59 NEED FOR HEPATITIS C SCREENING TEST: ICD-10-CM

## 2024-02-09 DIAGNOSIS — Z13.6 SCREENING FOR HEART DISEASE: ICD-10-CM

## 2024-02-09 LAB
ALBUMIN SERPL BCP-MCNC: 4.2 G/DL (ref 3.5–5)
ALP SERPL-CCNC: 46 U/L (ref 34–104)
ALT SERPL W P-5'-P-CCNC: 14 U/L (ref 7–52)
ANION GAP SERPL CALCULATED.3IONS-SCNC: 7 MMOL/L
AST SERPL W P-5'-P-CCNC: 16 U/L (ref 13–39)
BASOPHILS # BLD AUTO: 0.05 THOUSANDS/ÂΜL (ref 0–0.1)
BASOPHILS NFR BLD AUTO: 1 % (ref 0–1)
BILIRUB SERPL-MCNC: 1.1 MG/DL (ref 0.2–1)
BUN SERPL-MCNC: 11 MG/DL (ref 5–25)
CALCIUM SERPL-MCNC: 9.4 MG/DL (ref 8.4–10.2)
CHLORIDE SERPL-SCNC: 104 MMOL/L (ref 96–108)
CHOLEST SERPL-MCNC: 177 MG/DL
CO2 SERPL-SCNC: 28 MMOL/L (ref 21–32)
CREAT SERPL-MCNC: 0.63 MG/DL (ref 0.6–1.3)
EOSINOPHIL # BLD AUTO: 0.08 THOUSAND/ÂΜL (ref 0–0.61)
EOSINOPHIL NFR BLD AUTO: 1 % (ref 0–6)
ERYTHROCYTE [DISTWIDTH] IN BLOOD BY AUTOMATED COUNT: 11.8 % (ref 11.6–15.1)
GFR SERPL CREATININE-BSD FRML MDRD: 110 ML/MIN/1.73SQ M
GLUCOSE P FAST SERPL-MCNC: 82 MG/DL (ref 65–99)
HCT VFR BLD AUTO: 40.5 % (ref 34.8–46.1)
HCV AB SER QL: NORMAL
HDLC SERPL-MCNC: 55 MG/DL
HGB BLD-MCNC: 13.9 G/DL (ref 11.5–15.4)
IMM GRANULOCYTES # BLD AUTO: 0 THOUSAND/UL (ref 0–0.2)
IMM GRANULOCYTES NFR BLD AUTO: 0 % (ref 0–2)
LDLC SERPL CALC-MCNC: 113 MG/DL (ref 0–100)
LYMPHOCYTES # BLD AUTO: 2.23 THOUSANDS/ÂΜL (ref 0.6–4.47)
LYMPHOCYTES NFR BLD AUTO: 40 % (ref 14–44)
MCH RBC QN AUTO: 31.2 PG (ref 26.8–34.3)
MCHC RBC AUTO-ENTMCNC: 34.3 G/DL (ref 31.4–37.4)
MCV RBC AUTO: 91 FL (ref 82–98)
MONOCYTES # BLD AUTO: 0.46 THOUSAND/ÂΜL (ref 0.17–1.22)
MONOCYTES NFR BLD AUTO: 8 % (ref 4–12)
NEUTROPHILS # BLD AUTO: 2.82 THOUSANDS/ÂΜL (ref 1.85–7.62)
NEUTS SEG NFR BLD AUTO: 50 % (ref 43–75)
NONHDLC SERPL-MCNC: 122 MG/DL
NRBC BLD AUTO-RTO: 0 /100 WBCS
PLATELET # BLD AUTO: 261 THOUSANDS/UL (ref 149–390)
PMV BLD AUTO: 10.3 FL (ref 8.9–12.7)
POTASSIUM SERPL-SCNC: 4.3 MMOL/L (ref 3.5–5.3)
PROT SERPL-MCNC: 6.8 G/DL (ref 6.4–8.4)
RBC # BLD AUTO: 4.45 MILLION/UL (ref 3.81–5.12)
SODIUM SERPL-SCNC: 139 MMOL/L (ref 135–147)
TRIGL SERPL-MCNC: 45 MG/DL
WBC # BLD AUTO: 5.64 THOUSAND/UL (ref 4.31–10.16)

## 2024-02-09 PROCEDURE — 36415 COLL VENOUS BLD VENIPUNCTURE: CPT

## 2024-02-09 PROCEDURE — 80061 LIPID PANEL: CPT

## 2024-02-09 PROCEDURE — 86803 HEPATITIS C AB TEST: CPT

## 2024-02-09 PROCEDURE — 80053 COMPREHEN METABOLIC PANEL: CPT

## 2024-02-09 PROCEDURE — 85025 COMPLETE CBC W/AUTO DIFF WBC: CPT

## 2024-02-26 DIAGNOSIS — F41.9 ANXIETY: Primary | ICD-10-CM

## 2024-02-26 RX ORDER — FLUOXETINE HYDROCHLORIDE 20 MG/1
20 CAPSULE ORAL DAILY
Qty: 90 CAPSULE | Refills: 0 | Status: SHIPPED | OUTPATIENT
Start: 2024-02-26

## 2024-04-10 ENCOUNTER — OFFICE VISIT (OUTPATIENT)
Dept: OBGYN CLINIC | Facility: CLINIC | Age: 44
End: 2024-04-10
Payer: COMMERCIAL

## 2024-04-10 VITALS
WEIGHT: 160.6 LBS | BODY MASS INDEX: 26.76 KG/M2 | SYSTOLIC BLOOD PRESSURE: 118 MMHG | DIASTOLIC BLOOD PRESSURE: 80 MMHG | HEIGHT: 65 IN

## 2024-04-10 DIAGNOSIS — Z01.419 ENCOUNTER FOR GYNECOLOGICAL EXAMINATION WITHOUT ABNORMAL FINDING: Primary | ICD-10-CM

## 2024-04-10 DIAGNOSIS — R92.8 ABNORMAL MAMMOGRAM: ICD-10-CM

## 2024-04-10 PROBLEM — F32.A DEPRESSION: Status: ACTIVE | Noted: 2020-03-17

## 2024-04-10 PROBLEM — G43.109 MIGRAINE WITH AURA: Status: ACTIVE | Noted: 2020-03-17

## 2024-04-10 PROBLEM — I88.9 LYMPHADENITIS: Status: ACTIVE | Noted: 2017-11-09

## 2024-04-10 PROBLEM — B00.1 HERPES LABIALIS: Status: ACTIVE | Noted: 2017-11-09

## 2024-04-10 PROBLEM — Z00.00 ANNUAL PHYSICAL EXAM: Status: RESOLVED | Noted: 2018-08-20 | Resolved: 2024-04-10

## 2024-04-10 PROCEDURE — S0610 ANNUAL GYNECOLOGICAL EXAMINA: HCPCS | Performed by: NURSE PRACTITIONER

## 2024-04-10 NOTE — PROGRESS NOTES
Diagnoses and all orders for this visit:    Encounter for gynecological examination without abnormal finding    Abnormal mammogram  -     Mammo diagnostic bilateral w 3d & cad; Future      Calcium/vit d inclusion in the diet discussed, call with any issues, SBE reinforced, all concerns addressed.           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 She denies any issues with heavy bleeding or her menses. Menarche at 12. She reports regular monthly cycles that last 6-7 days. She has a history of abnormal pap smears in her 20's. Last Pap and HPV tests were done on 03/17/2020 neg/neg. A pap was NOT done today. She denies vaginal issues. She denies pelvic pain. She denies dyspareunia. She is not on a BCM. She is not sexually active x 1 year, . Last mammogram was done on 06/16/2023 BR 3, 12/2023 f/u nml, repeat due now.    Past Medical History:   Diagnosis Date    Abnormal Pap smear of cervix     Anxiety     Depression 9/2018    Miscarriage 2009, 2018     History reviewed. No pertinent surgical history.  Family History   Problem Relation Age of Onset    COPD Father     Hyperlipidemia Father     Asthma Father     Heart disease Maternal Grandmother     Breast cancer Maternal Grandmother     Diabetes Maternal Grandmother     Heart disease Maternal Grandfather     Diabetes Maternal Grandfather     Diabetes Paternal Grandmother      Social History     Tobacco Use    Smoking status: Former     Types: Cigarettes    Smokeless tobacco: Never   Vaping Use    Vaping status: Never Used   Substance Use Topics    Alcohol use: Yes     Alcohol/week: 2.0 standard drinks of alcohol     Types: 1 Glasses of wine, 1 Cans of beer per week     Comment: per week    Drug use: No       Current Outpatient Medications:     ciclopirox (PENLAC) 8 % solution, Apply topically daily at bedtime Apply Penlac at bedtime to the affected toenails daily, wipe off the Penlac from the toenails with acetone or rubbing alcohol on the  "7th day and restart the cycle., Disp: 6 mL, Rfl: 3    FLUoxetine (PROzac) 20 mg capsule, Take 1 capsule (20 mg total) by mouth daily, Disp: 90 capsule, Rfl: 0  Patient Active Problem List    Diagnosis Date Noted    Depression 2020    Migraine with aura 2020    Anxiety 2018    Herpes labialis 2017    Lymphadenitis 2017       No Known Allergies    OB History    Para Term  AB Living   4 2     2 2   SAB IAB Ectopic Multiple Live Births   2       2      # Outcome Date GA Lbr Bertrand/2nd Weight Sex Delivery Anes PTL Lv   4 SAB            3 SAB            2 Para            1 Para               [facility] now    Vitals:    04/10/24 0750   BP: 118/80   Weight: 72.8 kg (160 lb 9.6 oz)   Height: 5' 5\" (1.651 m)     Body mass index is 26.73 kg/m².  Patient's last menstrual period was 2024 (exact date).    Review of Systems   Constitutional: Negative for chills, fatigue, fever and unexpected weight change.   Respiratory: Negative for shortness of breath.    Gastrointestinal: Negative for anal bleeding, blood in stool, constipation and diarrhea.   Genitourinary: Negative for difficulty urinating, dysuria and hematuria.     Physical Exam   Constitutional: She appears well-developed and well-nourished. No distress.   HENT: atraumatic, EOMI  Head: Normocephalic.   Neck: Normal range of motion. Neck supple.   Pulmonary: Effort normal.  Breasts: bilateral without masses, skin changes or nipple discharge. Bilaterally soft and warm to touch. No areas of erythema or pain.  Abdominal: Soft.   Pelvic exam was performed with patient supine. No labial fusion. There is no rash, tenderness, lesion or injury on the right labia. There is no rash, tenderness, lesion or injury on the left labia. Urethral meatus does not show any tenderness, inflammation or discharge. Palpation of midline bladder without pain or discomfort.Uterus is not deviated, not enlarged, not fixed " and not tender. Cervix exhibits no motion tenderness, no discharge and no friability. Right adnexum displays no mass, no tenderness and no fullness. Left adnexum displays no mass, no tenderness and no fullness. No erythema or tenderness in the vagina. No foreign body in the vagina. No signs of injury around the vagina. No vaginal discharge found. No signs of injury around the vagina or anus. Perineum without lesions, signs of injury, erythema or swelling.  Lymphadenopathy:        Right: No inguinal adenopathy present.        Left: No inguinal adenopathy present.

## 2024-05-08 ENCOUNTER — RA CDI HCC (OUTPATIENT)
Dept: OTHER | Facility: HOSPITAL | Age: 44
End: 2024-05-08

## 2024-05-15 ENCOUNTER — OFFICE VISIT (OUTPATIENT)
Dept: INTERNAL MEDICINE CLINIC | Facility: CLINIC | Age: 44
End: 2024-05-15
Payer: COMMERCIAL

## 2024-05-15 VITALS
SYSTOLIC BLOOD PRESSURE: 120 MMHG | DIASTOLIC BLOOD PRESSURE: 74 MMHG | HEIGHT: 65 IN | WEIGHT: 159 LBS | OXYGEN SATURATION: 98 % | HEART RATE: 97 BPM | BODY MASS INDEX: 26.49 KG/M2

## 2024-05-15 DIAGNOSIS — F32.A DEPRESSION, UNSPECIFIED DEPRESSION TYPE: ICD-10-CM

## 2024-05-15 DIAGNOSIS — Z13.6 SCREENING FOR HEART DISEASE: ICD-10-CM

## 2024-05-15 DIAGNOSIS — Z00.00 ANNUAL PHYSICAL EXAM: Primary | ICD-10-CM

## 2024-05-15 DIAGNOSIS — G43.109 MIGRAINE WITH AURA AND WITHOUT STATUS MIGRAINOSUS, NOT INTRACTABLE: ICD-10-CM

## 2024-05-15 DIAGNOSIS — F41.9 ANXIETY: ICD-10-CM

## 2024-05-15 DIAGNOSIS — B00.1 RECURRENT COLD SORES: ICD-10-CM

## 2024-05-15 DIAGNOSIS — R07.89 CHEST WALL PAIN: ICD-10-CM

## 2024-05-15 DIAGNOSIS — R07.9 CHEST PAIN, UNSPECIFIED TYPE: ICD-10-CM

## 2024-05-15 PROCEDURE — 99396 PREV VISIT EST AGE 40-64: CPT | Performed by: PHYSICIAN ASSISTANT

## 2024-05-15 PROCEDURE — 93000 ELECTROCARDIOGRAM COMPLETE: CPT | Performed by: PHYSICIAN ASSISTANT

## 2024-05-15 PROCEDURE — 99214 OFFICE O/P EST MOD 30 MIN: CPT | Performed by: PHYSICIAN ASSISTANT

## 2024-05-15 NOTE — PATIENT INSTRUCTIONS
General medical exam remains excellent.  Because of persistent, recurrent migraine headaches with aura despite now being off birth control, will ask neurology for consultation.  For ongoing intermittent chest wall pain.  Continues OTC ibuprofen, also topical heat.  Please keep me informed if chest pain changes.  Plan follow-up in 6 months, sooner as needed.

## 2024-05-15 NOTE — PROGRESS NOTES
Assessment/Plan:   Patient Instructions   General medical exam remains excellent.  Because of persistent, recurrent migraine headaches with aura despite now being off birth control, will ask neurology for consultation.  For ongoing intermittent chest wall pain.  Continues OTC ibuprofen, also topical heat.  Please keep me informed if chest pain changes.  Plan follow-up in 6 months, sooner as needed.     Quality Measures:   Depression Screening and Follow-up Plan: Patient's depression screening was positive with a PHQ-9 score of 10. Patient with underlying depression and was advised to continue current medications as prescribed. Patient advised to follow-up with PCP for further management.          Return in about 6 months (around 11/15/2024) for Next scheduled follow up.         Diagnoses and all orders for this visit:    Annual physical exam    Anxiety    Depression, unspecified depression type    Screening for heart disease  -     Comprehensive metabolic panel; Future  -     Lipid panel; Future    Recurrent cold sores  -     CBC and differential; Future    Chest pain, unspecified type  -     POCT ECG    Migraine with aura and without status migrainosus, not intractable  -     Ambulatory referral to Neurology; Future          Subjective:      Patient ID: Thea Ramirez is a 43 y.o. female.    43-year-old female presents for her annual physical.  Overall she feels well.  She does have episodes since 2022 of left-sided chest pain that can radiate into her left shoulder.  Not generally brought on by exertion.  Occasionally associated with diaphoresis, no palpitations.  Had been to the ER in 2022 for this, ER note reviewed, workup was essentially unremarkable.    Continues to use fluoxetine for depression.  Overall is helpful.    Migraines with aura: Had been decently controlled when on birth control.  Now that she is off she is having more frequent migraines with aura, they do respond to approximate 800 mg of ibuprofen  but that often causes upset stomach.  Patient would like referral to neurology to discuss further medications, more modern medications.        ALLERGIES:  No Known Allergies    CURRENT MEDICATIONS:    Current Outpatient Medications:     ciclopirox (PENLAC) 8 % solution, Apply topically daily at bedtime Apply Penlac at bedtime to the affected toenails daily, wipe off the Penlac from the toenails with acetone or rubbing alcohol on the 7th day and restart the cycle., Disp: 6 mL, Rfl: 3    FLUoxetine (PROzac) 20 mg capsule, Take 1 capsule (20 mg total) by mouth daily, Disp: 90 capsule, Rfl: 0    ACTIVE PROBLEM LIST:  Patient Active Problem List   Diagnosis    Anxiety    Herpes labialis    Depression    Lymphadenitis    Migraine with aura       PAST MEDICAL HISTORY:  Past Medical History:   Diagnosis Date    Abnormal Pap smear of cervix     Anxiety     Depression 9/2018    Miscarriage 2009, 2018       PAST SURGICAL HISTORY:  No past surgical history on file.    FAMILY HISTORY:  Family History   Problem Relation Age of Onset    COPD Father     Hyperlipidemia Father     Asthma Father     Heart disease Maternal Grandmother     Breast cancer Maternal Grandmother     Diabetes Maternal Grandmother     Heart disease Maternal Grandfather     Diabetes Maternal Grandfather     Diabetes Paternal Grandmother        SOCIAL HISTORY:  Social History     Socioeconomic History    Marital status: /Civil Union     Spouse name: Not on file    Number of children: Not on file    Years of education: Not on file    Highest education level: Not on file   Occupational History    Not on file   Tobacco Use    Smoking status: Former     Types: Cigarettes    Smokeless tobacco: Never   Vaping Use    Vaping status: Never Used   Substance and Sexual Activity    Alcohol use: Yes     Alcohol/week: 2.0 standard drinks of alcohol     Types: 1 Glasses of wine, 1 Cans of beer per week     Comment: per week    Drug use: No    Sexual activity: Not  "Currently     Partners: Male     Birth control/protection: Coitus interruptus   Other Topics Concern    Not on file   Social History Narrative        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   HENT:  Negative for congestion.    Eyes:  Negative for discharge.   Respiratory:  Negative for cough, chest tightness and shortness of breath.    Cardiovascular:  Positive for chest pain. Negative for palpitations and leg swelling.   Gastrointestinal:  Negative for abdominal pain, blood in stool, constipation, diarrhea, nausea and vomiting.   Endocrine: Negative for polydipsia, polyphagia and polyuria.   Genitourinary:  Negative for difficulty urinating.   Musculoskeletal:  Negative for arthralgias and myalgias.   Skin:  Negative for rash.   Allergic/Immunologic: Negative for immunocompromised state.   Neurological:  Positive for headaches. Negative for dizziness, syncope, weakness and light-headedness.   Hematological:  Negative for adenopathy. Does not bruise/bleed easily.   Psychiatric/Behavioral:  Negative for dysphoric mood, sleep disturbance and suicidal ideas. The patient is not nervous/anxious.          Objective:  Vitals:    05/15/24 0715   BP: 120/74   BP Location: Left arm   Patient Position: Sitting   Cuff Size: Standard   Pulse: 97   SpO2: 98%   Weight: 72.1 kg (159 lb)   Height: 5' 5\" (1.651 m)     Body mass index is 26.46 kg/m².     Physical Exam  Vitals and nursing note reviewed.   Constitutional:       General: She is not in acute distress.     Appearance: She " is well-developed. She is not ill-appearing.      Comments: Well-developed, well-nourished 43-year-old female appearing younger than stated age no acute distress.   HENT:      Head: Normocephalic and atraumatic.      Right Ear: Tympanic membrane, ear canal and external ear normal.      Left Ear: Tympanic membrane, ear canal and external ear normal.      Nose: Nose normal.      Mouth/Throat:      Mouth: Mucous membranes are moist.      Pharynx: Oropharynx is clear.   Eyes:      General: No scleral icterus.        Right eye: No discharge.         Left eye: No discharge.      Extraocular Movements: Extraocular movements intact.      Conjunctiva/sclera: Conjunctivae normal.      Pupils: Pupils are equal, round, and reactive to light.   Neck:      Thyroid: No thyromegaly.      Vascular: No carotid bruit.   Cardiovascular:      Rate and Rhythm: Normal rate and regular rhythm.      Pulses: Normal pulses.      Heart sounds: Normal heart sounds. No murmur heard.  Pulmonary:      Effort: Pulmonary effort is normal. No respiratory distress.      Breath sounds: Normal breath sounds.      Comments: Acute tenderness left costochondral junction 2, 3 area.  Chondrosternal junction at the right rib.  No rib compression tenderness.  Chest:      Chest wall: Tenderness present.   Abdominal:      General: Abdomen is flat. Bowel sounds are normal. There is no distension.      Palpations: Abdomen is soft. There is no hepatomegaly, splenomegaly or mass.      Tenderness: There is no abdominal tenderness. There is no right CVA tenderness, left CVA tenderness, guarding or rebound.   Musculoskeletal:         General: No swelling. Normal range of motion.      Cervical back: Normal range of motion and neck supple.      Right lower leg: No edema.      Left lower leg: No edema.   Lymphadenopathy:      Cervical: No cervical adenopathy.   Skin:     General: Skin is warm and dry.      Findings: No rash.   Neurological:      General: No focal deficit  present.      Mental Status: She is alert and oriented to person, place, and time.      Cranial Nerves: No cranial nerve deficit.      Sensory: No sensory deficit.      Motor: No weakness.      Coordination: Coordination normal.      Gait: Gait normal.      Deep Tendon Reflexes: Reflexes are normal and symmetric. Reflexes normal.   Psychiatric:         Mood and Affect: Mood normal.         Behavior: Behavior normal.         Thought Content: Thought content normal.         Judgment: Judgment normal.           RESULTS:  Cholesterol   Date/Time Value Ref Range Status   02/09/2024 07:45  See Comment mg/dL Final     Comment:     Cholesterol:         Pediatric <18 Years        Desirable          <170 mg/dL      Borderline High    170-199 mg/dL      High               >=200 mg/dL        Adult >=18 Years            Desirable        <200 mg/dL      Borderline High  200-239 mg/dL      High             >239 mg/dL       Triglycerides   Date/Time Value Ref Range Status   02/09/2024 07:45 AM 45 See Comment mg/dL Final     Comment:     Triglyceride:     0-9Y            <75mg/dL     10Y-17Y         <90 mg/dL       >=18Y     Normal          <150 mg/dL     Borderline High 150-199 mg/dL     High            200-499 mg/dL        Very High       >499 mg/dL    Specimen collection should occur prior to Metamizole administration due to the potential for falsely depressed results.     HDL, Direct   Date/Time Value Ref Range Status   02/09/2024 07:45 AM 55 >=50 mg/dL Final     LDL Calculated   Date/Time Value Ref Range Status   02/09/2024 07:45  (H) 0 - 100 mg/dL Final     Comment:     LDL Cholesterol:     Optimal           <100 mg/dl     Near Optimal      100-129 mg/dl     Above Optimal       Borderline High 130-159 mg/dl       High            160-189 mg/dl       Very High       >189 mg/dl         This screening LDL is a calculated result.   It does not have the accuracy of the Direct Measured LDL in the monitoring of patients  with hyperlipidemia and/or statin therapy.   Direct Measure LDL (NIN176) must be ordered separately in these patients.     Hemoglobin   Date/Time Value Ref Range Status   02/09/2024 07:45 AM 13.9 11.5 - 15.4 g/dL Final     Hematocrit   Date/Time Value Ref Range Status   02/09/2024 07:45 AM 40.5 34.8 - 46.1 % Final     Platelets   Date/Time Value Ref Range Status   02/09/2024 07:45  149 - 390 Thousands/uL Final     TSH 3RD GENERATON   Date/Time Value Ref Range Status   08/23/2018 07:20 AM 2.500 0.358 - 3.740 uIU/mL Final     Comment:     Using supplements with high doses of biotin 20 to more than 300 times greater than the adequate daily intake for adults of 30 mcg/day as established by the [place] [employer], can cause falsely depress results.     Free T4   Date/Time Value Ref Range Status   08/23/2018 07:20 AM 0.91 0.76 - 1.46 ng/dL Final     Comment:       Specimen collection should occur prior to Sulfasalazine administration due to the potential for falsely elevated results.     Sodium   Date/Time Value Ref Range Status   02/09/2024 07:45  135 - 147 mmol/L Final     BUN   Date/Time Value Ref Range Status   02/09/2024 07:45 AM 11 5 - 25 mg/dL Final     Creatinine   Date/Time Value Ref Range Status   02/09/2024 07:45 AM 0.63 0.60 - 1.30 mg/dL Final     Comment:     Standardized to IDMS reference method      In chart    This note was created with voice recognition software.  Phonic, grammatical and spelling errors may be present within the note as a result.

## 2024-05-23 DIAGNOSIS — F41.9 ANXIETY: ICD-10-CM

## 2024-05-23 RX ORDER — FLUOXETINE HYDROCHLORIDE 20 MG/1
20 CAPSULE ORAL DAILY
Qty: 90 CAPSULE | Refills: 1 | Status: SHIPPED | OUTPATIENT
Start: 2024-05-23

## 2024-06-05 ENCOUNTER — TELEPHONE (OUTPATIENT)
Age: 44
End: 2024-06-05

## 2024-06-05 DIAGNOSIS — R92.8 ABNORMAL MAMMOGRAM: Primary | ICD-10-CM

## 2024-06-05 NOTE — TELEPHONE ENCOUNTER
Daniella from [facility] called    Requesting order for Bilateral Diagnostic breast Mammogram  DX:R92.8  Scheduled for July 5th, 2024    Please Fax: [phone]     Please Advise. Thank you.

## 2024-06-05 NOTE — TELEPHONE ENCOUNTER
"Daily Note     Today's date: 2024  Patient name: Gold Van  : 1945  MRN: [id number]  Referring provider: Richard Herron*  Dx:   Encounter Diagnosis     ICD-10-CM    1. Right knee buckling  M25.361                      Subjective: Pt denied pain of the R knee prior to PT. He also denied any R knee buckling since his IE. \"I think I can feel my leg strengthening with my HEP.\"       Objective: See treatment diary below      Assessment: Initiated multiple strengthening exercises as outlined in pt's POC. Tolerated treatment well. Patient demonstrated fatigue post treatment, exhibited good technique with therapeutic exercises, and would benefit from continued PT      Plan: Continue per plan of care.  Progress treatment as tolerated.       Precautions: diabetic, osteopenia  POC exp 3/21/24  Manuals                                       Neuro Re-Ed        SLS balance        Dynamic balance: Tandem walking        Dynamic balance: standing hip 3 way        Dynamic balance: standing marches on foam        Dynamic balance: side stepping across foam                        Ther Ex        Nustep for LE strength and endurance L4 x10 mins L4 x10 min      Bridges 5\" 2x10 5\" x20      Supine SLR flexion 2x10 ea 2x10 ea      Sidelying hip abduction  2x10 ea      HS stretch        Lateral step ups  6\" 2x10 ea      Knee ext machine                Ther Activity        Mini squats with chair behind  x10      Mini lunges        Step ups  6\" 2x10 ea      Gait Training                        Modalities                             " Order pending please review and sign

## 2024-10-23 DIAGNOSIS — Z00.6 ENCOUNTER FOR EXAMINATION FOR NORMAL COMPARISON OR CONTROL IN CLINICAL RESEARCH PROGRAM: ICD-10-CM

## 2024-11-11 ENCOUNTER — TELEPHONE (OUTPATIENT)
Dept: INTERNAL MEDICINE CLINIC | Facility: CLINIC | Age: 44
End: 2024-11-11

## 2024-11-11 DIAGNOSIS — B00.9 HERPES SIMPLEX: Primary | ICD-10-CM

## 2024-11-11 RX ORDER — VALACYCLOVIR HYDROCHLORIDE 1 G/1
TABLET, FILM COATED ORAL
Qty: 4 TABLET | Refills: 1 | Status: SHIPPED | OUTPATIENT
Start: 2024-11-11 | End: 2024-11-12

## 2024-11-13 ENCOUNTER — APPOINTMENT (OUTPATIENT)
Dept: LAB | Facility: CLINIC | Age: 44
End: 2024-11-13
Payer: COMMERCIAL

## 2024-11-13 DIAGNOSIS — Z00.6 ENCOUNTER FOR EXAMINATION FOR NORMAL COMPARISON OR CONTROL IN CLINICAL RESEARCH PROGRAM: ICD-10-CM

## 2024-11-13 DIAGNOSIS — B00.1 RECURRENT COLD SORES: ICD-10-CM

## 2024-11-13 DIAGNOSIS — Z13.6 SCREENING FOR HEART DISEASE: ICD-10-CM

## 2024-11-13 LAB
ALBUMIN SERPL BCG-MCNC: 4 G/DL (ref 3.5–5)
ALP SERPL-CCNC: 52 U/L (ref 34–104)
ALT SERPL W P-5'-P-CCNC: 11 U/L (ref 7–52)
ANION GAP SERPL CALCULATED.3IONS-SCNC: 6 MMOL/L (ref 4–13)
AST SERPL W P-5'-P-CCNC: 20 U/L (ref 13–39)
BASOPHILS # BLD AUTO: 0.06 THOUSANDS/ÂΜL (ref 0–0.1)
BASOPHILS NFR BLD AUTO: 1 % (ref 0–1)
BILIRUB SERPL-MCNC: 0.92 MG/DL (ref 0.2–1)
BUN SERPL-MCNC: 12 MG/DL (ref 5–25)
CALCIUM SERPL-MCNC: 8.9 MG/DL (ref 8.4–10.2)
CHLORIDE SERPL-SCNC: 105 MMOL/L (ref 96–108)
CHOLEST SERPL-MCNC: 182 MG/DL (ref ?–200)
CO2 SERPL-SCNC: 28 MMOL/L (ref 21–32)
CREAT SERPL-MCNC: 0.69 MG/DL (ref 0.6–1.3)
EOSINOPHIL # BLD AUTO: 0.11 THOUSAND/ÂΜL (ref 0–0.61)
EOSINOPHIL NFR BLD AUTO: 2 % (ref 0–6)
ERYTHROCYTE [DISTWIDTH] IN BLOOD BY AUTOMATED COUNT: 12.3 % (ref 11.6–15.1)
GFR SERPL CREATININE-BSD FRML MDRD: 106 ML/MIN/1.73SQ M
GLUCOSE P FAST SERPL-MCNC: 84 MG/DL (ref 65–99)
HCT VFR BLD AUTO: 40.5 % (ref 34.8–46.1)
HDLC SERPL-MCNC: 52 MG/DL
HGB BLD-MCNC: 13.7 G/DL (ref 11.5–15.4)
IMM GRANULOCYTES # BLD AUTO: 0.01 THOUSAND/UL (ref 0–0.2)
IMM GRANULOCYTES NFR BLD AUTO: 0 % (ref 0–2)
LDLC SERPL CALC-MCNC: 120 MG/DL (ref 0–100)
LYMPHOCYTES # BLD AUTO: 2.18 THOUSANDS/ÂΜL (ref 0.6–4.47)
LYMPHOCYTES NFR BLD AUTO: 39 % (ref 14–44)
MCH RBC QN AUTO: 31.5 PG (ref 26.8–34.3)
MCHC RBC AUTO-ENTMCNC: 33.8 G/DL (ref 31.4–37.4)
MCV RBC AUTO: 93 FL (ref 82–98)
MONOCYTES # BLD AUTO: 0.44 THOUSAND/ÂΜL (ref 0.17–1.22)
MONOCYTES NFR BLD AUTO: 8 % (ref 4–12)
NEUTROPHILS # BLD AUTO: 2.79 THOUSANDS/ÂΜL (ref 1.85–7.62)
NEUTS SEG NFR BLD AUTO: 50 % (ref 43–75)
NONHDLC SERPL-MCNC: 130 MG/DL
NRBC BLD AUTO-RTO: 0 /100 WBCS
PLATELET # BLD AUTO: 239 THOUSANDS/UL (ref 149–390)
PMV BLD AUTO: 10.1 FL (ref 8.9–12.7)
POTASSIUM SERPL-SCNC: 4.2 MMOL/L (ref 3.5–5.3)
PROT SERPL-MCNC: 6.7 G/DL (ref 6.4–8.4)
RBC # BLD AUTO: 4.35 MILLION/UL (ref 3.81–5.12)
SODIUM SERPL-SCNC: 139 MMOL/L (ref 135–147)
TRIGL SERPL-MCNC: 51 MG/DL (ref ?–150)
WBC # BLD AUTO: 5.59 THOUSAND/UL (ref 4.31–10.16)

## 2024-11-13 PROCEDURE — 80053 COMPREHEN METABOLIC PANEL: CPT

## 2024-11-13 PROCEDURE — 85025 COMPLETE CBC W/AUTO DIFF WBC: CPT

## 2024-11-13 PROCEDURE — 36415 COLL VENOUS BLD VENIPUNCTURE: CPT

## 2024-11-13 PROCEDURE — 80061 LIPID PANEL: CPT

## 2024-11-14 ENCOUNTER — OFFICE VISIT (OUTPATIENT)
Dept: INTERNAL MEDICINE CLINIC | Facility: CLINIC | Age: 44
End: 2024-11-14
Payer: COMMERCIAL

## 2024-11-14 VITALS
HEART RATE: 83 BPM | WEIGHT: 164 LBS | DIASTOLIC BLOOD PRESSURE: 80 MMHG | HEIGHT: 65 IN | BODY MASS INDEX: 27.32 KG/M2 | SYSTOLIC BLOOD PRESSURE: 122 MMHG | OXYGEN SATURATION: 98 %

## 2024-11-14 DIAGNOSIS — Z13.6 SCREENING FOR HEART DISEASE: ICD-10-CM

## 2024-11-14 DIAGNOSIS — R53.83 FATIGUE, UNSPECIFIED TYPE: ICD-10-CM

## 2024-11-14 DIAGNOSIS — F41.9 ANXIETY: Primary | ICD-10-CM

## 2024-11-14 PROBLEM — I88.9 LYMPHADENITIS: Status: RESOLVED | Noted: 2017-11-09 | Resolved: 2024-11-14

## 2024-11-14 PROCEDURE — 99213 OFFICE O/P EST LOW 20 MIN: CPT | Performed by: PHYSICIAN ASSISTANT

## 2024-11-14 NOTE — PROGRESS NOTES
"Name: Thea Ramirez      : 1980      MRN: [id number]  Encounter Provider: Ryan Billings PA-C  Encounter Date: 2024   Encounter department: [facility] INTER[facility] MEDICINE [place]  :  Assessment & Plan  Anxiety         Screening for heart disease    Orders:    Lipid panel; Future    Fatigue, unspecified type    Orders:    CBC and differential; Future    Comprehensive metabolic panel; Future        History of Present Illness   Follow-up, labs reviewed with patient    Patient had been on fluoxetine for anxiety/depression however states it made her feel \"numb\" and not herself.  She did not feel it was really effective for the anxiety therefore she weaned herself off and states overall she feels well.  She is dealing with her anxiety without difficulty.  She knows we are here for her if she needs anything.    Minimal elevation of LDL cholesterol.  Patient will work on diet to reduce intake of processed foods, fried, fatty, greasy foods, cheese, red meat.    No other focal concerns.      Thea Ramirez is a 44 y.o. female who presents for follow-up    Review of Systems   Constitutional:  Negative for activity change, chills, fatigue and fever.   HENT:  Negative for congestion.    Eyes:  Negative for discharge.   Respiratory:  Negative for cough, chest tightness and shortness of breath.    Cardiovascular:  Negative for chest pain, palpitations and leg swelling.   Gastrointestinal:  Negative for abdominal pain, blood in stool, constipation, diarrhea, nausea and vomiting.   Endocrine: Negative for polydipsia, polyphagia and polyuria.   Genitourinary:  Negative for difficulty urinating.   Musculoskeletal:  Negative for arthralgias and myalgias.   Skin:  Negative for rash.   Allergic/Immunologic: Negative for immunocompromised state.   Neurological:  Negative for dizziness, syncope, weakness, light-headedness and headaches.   Hematological:  Negative for adenopathy. Does not bruise/bleed easily. " "  Psychiatric/Behavioral:  Negative for dysphoric mood, sleep disturbance and suicidal ideas. The patient is not nervous/anxious.           Objective   /80 (BP Location: Left arm, Patient Position: Sitting, Cuff Size: Standard)   Pulse 83   Ht 5' 5\" (1.651 m)   Wt 74.4 kg (164 lb)   SpO2 98%   BMI 27.29 kg/m²      Physical Exam  Constitutional:       General: She is not in acute distress.     Appearance: Normal appearance. She is not ill-appearing.   HENT:      Head: Normocephalic.   Eyes:      Pupils: Pupils are equal, round, and reactive to light.   Neck:      Thyroid: No thyromegaly.      Vascular: No carotid bruit.      Trachea: Trachea normal.   Cardiovascular:      Rate and Rhythm: Normal rate and regular rhythm.      Heart sounds: No murmur heard.  Pulmonary:      Effort: Pulmonary effort is normal. No respiratory distress.      Breath sounds: Normal breath sounds.   Musculoskeletal:         General: No swelling.      Cervical back: Neck supple.      Right lower leg: No edema.      Left lower leg: No edema.   Lymphadenopathy:      Cervical: No cervical adenopathy.   Skin:     General: Skin is warm and dry.      Findings: No rash.   Neurological:      General: No focal deficit present.      Mental Status: She is alert and oriented to person, place, and time. Mental status is at baseline.   Psychiatric:         Mood and Affect: Mood normal.         Behavior: Behavior normal.           "

## 2024-11-23 LAB
APOB+LDLR+PCSK9 GENE MUT ANL BLD/T: NOT DETECTED
BRCA1+BRCA2 DEL+DUP + FULL MUT ANL BLD/T: NOT DETECTED
MLH1+MSH2+MSH6+PMS2 GN DEL+DUP+FUL M: NOT DETECTED

## 2025-03-13 ENCOUNTER — HOSPITAL ENCOUNTER (OUTPATIENT)
Dept: ULTRASOUND IMAGING | Facility: CLINIC | Age: 45
End: 2025-03-13
Payer: COMMERCIAL

## 2025-03-13 ENCOUNTER — HOSPITAL ENCOUNTER (OUTPATIENT)
Dept: MAMMOGRAPHY | Facility: CLINIC | Age: 45
End: 2025-03-13
Payer: COMMERCIAL

## 2025-03-13 ENCOUNTER — RESULTS FOLLOW-UP (OUTPATIENT)
Age: 45
End: 2025-03-13

## 2025-03-13 DIAGNOSIS — R92.8 ABNORMAL MAMMOGRAM: ICD-10-CM

## 2025-03-13 PROCEDURE — 77066 DX MAMMO INCL CAD BI: CPT

## 2025-03-13 PROCEDURE — G0279 TOMOSYNTHESIS, MAMMO: HCPCS

## 2025-03-13 PROCEDURE — 76642 ULTRASOUND BREAST LIMITED: CPT

## 2025-05-07 ENCOUNTER — RA CDI HCC (OUTPATIENT)
Dept: OTHER | Facility: HOSPITAL | Age: 45
End: 2025-05-07

## 2025-05-19 ENCOUNTER — ANNUAL EXAM (OUTPATIENT)
Dept: OBGYN CLINIC | Facility: CLINIC | Age: 45
End: 2025-05-19

## 2025-05-19 VITALS — WEIGHT: 167.6 LBS | SYSTOLIC BLOOD PRESSURE: 118 MMHG | DIASTOLIC BLOOD PRESSURE: 82 MMHG | BODY MASS INDEX: 27.89 KG/M2

## 2025-05-19 DIAGNOSIS — Z01.419 ENCOUNTER FOR ANNUAL ROUTINE GYNECOLOGICAL EXAMINATION: Primary | ICD-10-CM

## 2025-05-19 DIAGNOSIS — N95.1 PERIMENOPAUSAL SYMPTOMS: ICD-10-CM

## 2025-05-19 DIAGNOSIS — R92.333 HETEROGENEOUSLY DENSE TISSUE OF BOTH BREASTS ON MAMMOGRAPHY: ICD-10-CM

## 2025-05-19 DIAGNOSIS — Z12.4 SCREENING FOR CERVICAL CANCER: ICD-10-CM

## 2025-05-19 DIAGNOSIS — Z12.31 ENCOUNTER FOR SCREENING MAMMOGRAM FOR MALIGNANT NEOPLASM OF BREAST: ICD-10-CM

## 2025-05-19 PROCEDURE — G0476 HPV COMBO ASSAY CA SCREEN: HCPCS | Performed by: NURSE PRACTITIONER

## 2025-05-19 PROCEDURE — G0145 SCR C/V CYTO,THINLAYER,RESCR: HCPCS | Performed by: NURSE PRACTITIONER

## 2025-05-19 NOTE — PATIENT INSTRUCTIONS
Patient Education     Lowering Your Risk of Breast Cancer   About this topic   Breast cancer is a serious illness. Breast cancer is when abnormal cells grow and divide more quickly in your breast. These cells form a growth or tumor. The abnormal cells may enter nearby tissue and spread to other parts of the body. It is the type of cancer most often seen in women. Men can have breast cancer, but it is a rare condition.  General   Some things in your life may increase your risk of breast cancer. You may not be able to change some of these. Others you can control.  You are more likely to get breast cancer if you:  Have a mother, sister, or daughter who has had breast cancer  Have used hormones for menopause for more than 5 years  Have had radiation therapy to the breast or chest in the past  Are overweight or do not exercise  Had your first menstrual period before you were 11 years old  Went through menopause after age 55  Have never been pregnant or had your first child after age 35  Have had breast cancer before  Drink alcohol in any form  Have dense breasts  Are older in age  There is no certain way to prevent breast cancer. There are things you can do to lower your chances of having breast cancer.  Keep a healthy weight. Lose weight if you are overweight. Being overweight raises your chances of having breast cancer.  Eat a healthy diet to maintain a healthy weight, such as more fruits, vegetables, and lean cuts of meat. Decrease the amount of saturated fat in your diet.  Exercise. Being active helps you keep a healthy weight.  Limit your alcohol intake or do not drink alcohol. The more alcohol you drink, the higher your risk.  Do not smoke cigarettes. Smoking can increase your risk of many types of cancer.  Breastfeed your baby. This may help protect you. The longer you breastfeed, the more protection you have.  Talk with your doctor about:  Limiting or stopping hormone therapy.  Taking certain drugs to prevent  breast cancer. For women at high risk of having breast cancer, there are a few drugs that may lower your risk.  Surgery to prevent you from having breast cancer if you are very high risk.  When do I need to call the doctor?   Changes in your breasts  A lump or area in your breast that feels different  Discharge from your nipple  Skin on your breast is dimpled or indented  You have questions or concerns about your breasts  Helpful tips   Talk to your doctor about the best kind of breast cancer screening for you.  If you want to do self breast exams, have your doctor show you the right way to do them.  Tell your doctor of any abnormal finding.  Last Reviewed Date   2021-10-04  Consumer Information Use and Disclaimer   This generalized information is a limited summary of diagnosis, treatment, and/or medication information. It is not meant to be comprehensive and should be used as a tool to help the user understand and/or assess potential diagnostic and treatment options. It does NOT include all information about conditions, treatments, medications, side effects, or risks that may apply to a specific patient. It is not intended to be medical advice or a substitute for the medical advice, diagnosis, or treatment of a health care provider based on the health care provider's examination and assessment of a patient’s specific and unique circumstances. Patients must speak with a health care provider for complete information about their health, medical questions, and treatment options, including any risks or benefits regarding use of medications. This information does not endorse any treatments or medications as safe, effective, or approved for treating a specific patient. [employer] and its affiliates disclaim any warranty or liability relating to this information or the use thereof. The use of this information is governed by the Terms of Use, available at  https://www.woltersThinkSmartuwer.com/en/know/clinical-effectiveness-terms   Copyright   Copyright © 2024 [employer], [employer] and its affiliates and/or licensors. All rights reserved.

## 2025-05-19 NOTE — PROGRESS NOTES
Name: Thea Ramirez      : 1980      MRN: [id number]  Encounter Provider: ANJU Chahal  Encounter Date: 2025   Encounter department: [facility]  :  Assessment & Plan  Encounter for annual routine gynecological examination  Pap with high risk HPV testing every 5 years, if normal.   Sexually transmitted infection testing as indicated.  Exercise most days of week-minimum of 150-300 minutes per week.   Obtain appropriate diet and hydration.   Calcium 1000mg (in divided doses-max 600 mg at one time) + 600 vit D daily.  Annual mammogram starting at age 40, monthly breast self exam recommended.   Kegels 20 times twice daily.   Call your insurance company to verify coverage prior to completing any ordered tests.   Return to office in one year or sooner, if needed.          Heterogeneously dense tissue of both breasts on mammography    Orders:    MRI breast bilateral w and wo contrast w cad; Future    Perimenopausal symptoms  Patient to continue her menses calendar.  She declined PT today.  She will continue to take black cohosh for her current hot flashes.  Herbal list of hot flash meds given to her today       Screening for cervical cancer    Orders:    Liquid-based pap, screening    Encounter for screening mammogram for malignant neoplasm of breast    Orders:    Mammo screening bilateral w 3d and cad; Future        History of Present Illness   HPI  Thea Ramirez is a 44 y.o. female who presents for routine annual examination with no concerns.  Patient would like to get supplemental screening for her breast.  Sister with breast cancer diagnosed at 47.  Patient has had Helix testing which was negative for BRCA.  Patient started skipping more cycles this year and having perimenopausal symptoms.  She declines a pregnancy test today.    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 ABUS ordered for dense breasts, pt instructed to check on insurance coverage for this test. Advised to do 6 months after her mammogram for continued surveillance. Patient agrees with this plan.    Next Mammogram  3/2026 (MRI - 6mths rec)  US right breast recommend - 3/13/25  HPV vaccine series completed: No    Hereditary Cancer Screening  FH Breast/Ovarian cancer: Sister/Maternal GM/Breast Ca  FH Uterine cancer: denies  FH Colon cancer: denies  Cancer-related family history includes Breast cancer in her maternal grandmother; Breast cancer (age of onset: 48) in her sister; Cervical cancer in her mother; Prostate cancer in her paternal grandfather.        Review of Systems   Constitutional:  Negative for chills, fatigue, fever and unexpected weight change.   Respiratory:  Negative for shortness of breath.    Gastrointestinal:  Negative for anal bleeding, blood in stool, constipation and diarrhea.   Genitourinary:  Negative for difficulty urinating, dysuria and hematuria.   Neurological:  Negative for weakness.   Psychiatric/Behavioral:  Negative for agitation, behavioral problems and confusion.           Objective   /82 (BP Location: Left arm, Patient Position: Sitting, Cuff Size: Standard)   Wt 76 kg (167 lb 9.6 oz)   LMP 03/08/2025   BMI 27.89 kg/m²      Physical Exam  Constitutional:       General: She is not in acute distress.     Appearance: She is well-developed.   HENT:      Head: Normocephalic.   Pulmonary:      Effort: Pulmonary effort is normal.   Chest:   Breasts:     Breasts are symmetrical.      Right: No inverted nipple, mass, nipple discharge, skin change or tenderness.      Left: No inverted nipple, mass, nipple discharge, skin change or tenderness.   Abdominal:      Palpations: Abdomen is soft.    Genitourinary:     Exam position: Supine.      Labia:         Right: No rash, tenderness, lesion or injury.         Left: No rash, tenderness, lesion or injury.       Vagina: No signs of injury and foreign body. No vaginal discharge, erythema or tenderness.      Cervix: No cervical motion tenderness, discharge or friability.      Uterus: Not deviated, not enlarged, not fixed and not tender.       Adnexa:         Right: No mass, tenderness or fullness.          Left: No mass, tenderness or fullness.       Musculoskeletal:      Cervical back: Normal range of motion and neck supple.

## 2025-05-20 LAB
HPV HR 12 DNA CVX QL NAA+PROBE: NEGATIVE
HPV16 DNA CVX QL NAA+PROBE: NEGATIVE
HPV18 DNA CVX QL NAA+PROBE: NEGATIVE

## 2025-05-21 ENCOUNTER — APPOINTMENT (OUTPATIENT)
Dept: LAB | Facility: HOSPITAL | Age: 45
End: 2025-05-21
Payer: COMMERCIAL

## 2025-05-21 DIAGNOSIS — R53.83 FATIGUE, UNSPECIFIED TYPE: ICD-10-CM

## 2025-05-21 DIAGNOSIS — Z13.6 SCREENING FOR HEART DISEASE: ICD-10-CM

## 2025-05-21 LAB
ALBUMIN SERPL BCG-MCNC: 3.9 G/DL (ref 3.5–5)
ALP SERPL-CCNC: 48 U/L (ref 34–104)
ALT SERPL W P-5'-P-CCNC: 21 U/L (ref 7–52)
ANION GAP SERPL CALCULATED.3IONS-SCNC: 4 MMOL/L (ref 4–13)
AST SERPL W P-5'-P-CCNC: 22 U/L (ref 13–39)
BASOPHILS # BLD AUTO: 0.05 THOUSANDS/ÂΜL (ref 0–0.1)
BASOPHILS NFR BLD AUTO: 1 % (ref 0–1)
BILIRUB SERPL-MCNC: 1.05 MG/DL (ref 0.2–1)
BUN SERPL-MCNC: 10 MG/DL (ref 5–25)
CALCIUM SERPL-MCNC: 8.8 MG/DL (ref 8.4–10.2)
CHLORIDE SERPL-SCNC: 104 MMOL/L (ref 96–108)
CHOLEST SERPL-MCNC: 175 MG/DL (ref ?–200)
CO2 SERPL-SCNC: 29 MMOL/L (ref 21–32)
CREAT SERPL-MCNC: 0.59 MG/DL (ref 0.6–1.3)
EOSINOPHIL # BLD AUTO: 0.06 THOUSAND/ÂΜL (ref 0–0.61)
EOSINOPHIL NFR BLD AUTO: 1 % (ref 0–6)
ERYTHROCYTE [DISTWIDTH] IN BLOOD BY AUTOMATED COUNT: 12.5 % (ref 11.6–15.1)
GFR SERPL CREATININE-BSD FRML MDRD: 111 ML/MIN/1.73SQ M
GLUCOSE P FAST SERPL-MCNC: 88 MG/DL (ref 65–99)
HCT VFR BLD AUTO: 39.8 % (ref 34.8–46.1)
HDLC SERPL-MCNC: 55 MG/DL
HGB BLD-MCNC: 13.8 G/DL (ref 11.5–15.4)
IMM GRANULOCYTES # BLD AUTO: 0.02 THOUSAND/UL (ref 0–0.2)
IMM GRANULOCYTES NFR BLD AUTO: 0 % (ref 0–2)
LDLC SERPL CALC-MCNC: 110 MG/DL (ref 0–100)
LYMPHOCYTES # BLD AUTO: 2.18 THOUSANDS/ÂΜL (ref 0.6–4.47)
LYMPHOCYTES NFR BLD AUTO: 41 % (ref 14–44)
MCH RBC QN AUTO: 31.2 PG (ref 26.8–34.3)
MCHC RBC AUTO-ENTMCNC: 34.7 G/DL (ref 31.4–37.4)
MCV RBC AUTO: 90 FL (ref 82–98)
MONOCYTES # BLD AUTO: 0.36 THOUSAND/ÂΜL (ref 0.17–1.22)
MONOCYTES NFR BLD AUTO: 7 % (ref 4–12)
NEUTROPHILS # BLD AUTO: 2.71 THOUSANDS/ÂΜL (ref 1.85–7.62)
NEUTS SEG NFR BLD AUTO: 50 % (ref 43–75)
NONHDLC SERPL-MCNC: 120 MG/DL
NRBC BLD AUTO-RTO: 0 /100 WBCS
PLATELET # BLD AUTO: 220 THOUSANDS/UL (ref 149–390)
PMV BLD AUTO: 9.3 FL (ref 8.9–12.7)
POTASSIUM SERPL-SCNC: 4 MMOL/L (ref 3.5–5.3)
PROT SERPL-MCNC: 6.5 G/DL (ref 6.4–8.4)
RBC # BLD AUTO: 4.42 MILLION/UL (ref 3.81–5.12)
SODIUM SERPL-SCNC: 137 MMOL/L (ref 135–147)
TRIGL SERPL-MCNC: 49 MG/DL (ref ?–150)
WBC # BLD AUTO: 5.38 THOUSAND/UL (ref 4.31–10.16)

## 2025-05-21 PROCEDURE — 85025 COMPLETE CBC W/AUTO DIFF WBC: CPT

## 2025-05-21 PROCEDURE — 80061 LIPID PANEL: CPT

## 2025-05-21 PROCEDURE — 36415 COLL VENOUS BLD VENIPUNCTURE: CPT

## 2025-05-21 PROCEDURE — 80053 COMPREHEN METABOLIC PANEL: CPT

## 2025-05-22 ENCOUNTER — RESULTS FOLLOW-UP (OUTPATIENT)
Age: 45
End: 2025-05-22

## 2025-05-22 ENCOUNTER — OFFICE VISIT (OUTPATIENT)
Dept: INTERNAL MEDICINE CLINIC | Facility: CLINIC | Age: 45
End: 2025-05-22
Payer: COMMERCIAL

## 2025-05-22 VITALS
HEIGHT: 65 IN | WEIGHT: 166 LBS | HEART RATE: 83 BPM | SYSTOLIC BLOOD PRESSURE: 110 MMHG | BODY MASS INDEX: 27.66 KG/M2 | DIASTOLIC BLOOD PRESSURE: 70 MMHG | OXYGEN SATURATION: 97 %

## 2025-05-22 DIAGNOSIS — F41.9 ANXIETY: ICD-10-CM

## 2025-05-22 DIAGNOSIS — Z00.00 ANNUAL PHYSICAL EXAM: Primary | ICD-10-CM

## 2025-05-22 PROBLEM — F32.A DEPRESSION: Status: RESOLVED | Noted: 2020-03-17 | Resolved: 2025-05-22

## 2025-05-22 LAB
LAB AP GYN PRIMARY INTERPRETATION: NORMAL
Lab: NORMAL

## 2025-05-22 PROCEDURE — 99396 PREV VISIT EST AGE 40-64: CPT

## 2025-05-22 NOTE — PROGRESS NOTES
Adult Annual Physical  Name: Thea Ramirez      : 1980      MRN: [id number]  Encounter Provider: ANJU Rodriguez  Encounter Date: 2025   Encounter department: [facility] INTER[facility]IC[facility] [place]    :  Assessment & Plan  Annual physical exam  Completed and reviewed with the patient. Labs reviewed as well.       Anxiety  Orders:  •  Ambulatory referral to Psych Services; Future  •  TSH, 3rd generation with Free T4 reflex; Future        Preventive Screenings:  - Diabetes Screening: screening up-to-date  - Cholesterol Screening: screening up-to-date   - Hepatitis C screening: screening up-to-date   - HIV screening: screening up-to-date   - Cervical cancer screening: screening up-to-date   - Breast cancer screening: screening up-to-date   - Lung cancer screening: screening not indicated     Immunizations:  - Immunizations due: Tdap    Counseling/Anticipatory Guidance:    - Dental health: discussed importance of regular tooth brushing, flossing, and dental visits.   - Diet: discussed recommendations for a healthy/well-balanced diet.   - Exercise: the importance of regular exercise/physical activity was discussed. Recommend exercise 3-5 times per week for at least 30 minutes.   - Injury prevention: discussed safety/seat belts, safety helmets, smoke detectors, carbon monoxide detectors, and smoking near bedding or upholstery.       Depression Screening and Follow-up Plan: Patient was screened for depression during today's encounter. They screened negative with a PHQ-2 score of 1.          History of Present Illness   {?Quick Links Encounters * My Last Note * Last Note in Specialty * Snapshot * Since Last Visit * History :65089}  Adult Annual Physical:  Patient presents for annual physical. She has been doing well. She has noticed that she has struggled with focus, motivation, concentration, and many other symptoms of attention deficit. She was hoping to be further evaluated now that she's seeing  "similar symptoms in her daughter.     Diet and Physical Activity:  - Diet/Nutrition: well balanced diet.  - Exercise: walking and 3-4 times a week on average.    Depression Screening:  - PHQ-2 Score: 1    General Health:  - Sleep: 7-8 hours of sleep on average.  - Hearing: normal hearing bilateral ears.  - Vision: no vision problems and most recent eye exam > 1 year ago.  - Dental: regular dental visits, no dental visits for > 1 year and brushes teeth once daily.    /GYN Health:  - Follows with GYN: yes.   - Menopause: perimenopausal.   - Last menstrual cycle: 3/8/2025.   - History of STDs: no    Advanced Care Planning:  - Has an advanced directive?: no    - Has a durable medical POA?: no      Review of Systems   Constitutional:  Negative for chills and fever.   HENT:  Negative for congestion, ear pain, rhinorrhea and sore throat.    Eyes:  Negative for pain and visual disturbance.   Respiratory:  Negative for cough, chest tightness and shortness of breath.    Cardiovascular:  Negative for chest pain, palpitations and leg swelling.   Gastrointestinal:  Negative for abdominal pain, constipation, diarrhea, nausea and vomiting.   Endocrine: Negative.    Genitourinary:  Negative for dysuria, frequency, hematuria and urgency.   Musculoskeletal:  Negative for arthralgias and back pain.   Skin:  Negative for color change and rash.   Allergic/Immunologic: Negative.    Neurological:  Negative for dizziness, seizures, syncope and headaches.   Hematological: Negative.    Psychiatric/Behavioral: Negative.     All other systems reviewed and are negative.        Objective {?Quick Links Trend Vitals * Enter New Vitals * Results Review * Timeline (Adult) * Labs * Imaging * Cardiology * Procedures * Lung Cancer Screening * Surgical eConsent :19984}  /70 (BP Location: Left arm, Patient Position: Sitting, Cuff Size: Standard)   Pulse 83   Ht 5' 5\" (1.651 m)   Wt 75.3 kg (166 lb)   LMP 03/08/2025   SpO2 97%   BMI 27.62 " kg/m²     Physical Exam  Vitals and nursing note reviewed.   Constitutional:       General: She is not in acute distress.     Appearance: She is well-developed.   HENT:      Head: Normocephalic and atraumatic.      Right Ear: Tympanic membrane normal.      Left Ear: Tympanic membrane normal.      Mouth/Throat:      Mouth: Mucous membranes are moist.      Pharynx: Oropharynx is clear.     Eyes:      Conjunctiva/sclera: Conjunctivae normal.     Neck:      Vascular: No carotid bruit.     Cardiovascular:      Rate and Rhythm: Normal rate and regular rhythm.      Pulses: Normal pulses.      Heart sounds: Normal heart sounds. No murmur heard.  Pulmonary:      Effort: Pulmonary effort is normal. No respiratory distress.      Breath sounds: Normal breath sounds.   Abdominal:      General: Bowel sounds are normal.      Palpations: Abdomen is soft.      Tenderness: There is no abdominal tenderness. There is no right CVA tenderness or left CVA tenderness.     Musculoskeletal:         General: No swelling. Normal range of motion.      Cervical back: Normal range of motion and neck supple.   Lymphadenopathy:      Cervical: No cervical adenopathy.     Skin:     General: Skin is warm and dry.      Capillary Refill: Capillary refill takes less than 2 seconds.     Neurological:      Mental Status: She is alert and oriented to person, place, and time.     Psychiatric:         Mood and Affect: Mood normal.

## 2025-05-22 NOTE — ASSESSMENT & PLAN NOTE
Orders:  •  Ambulatory referral to Psych Services; Future  •  TSH, 3rd generation with Free T4 reflex; Future

## 2025-05-22 NOTE — PATIENT INSTRUCTIONS
"For anti-inflammatory over the counter vitamins:  - Turmeric vitamin at least 1000 mg daily  - Tart cherry vitamin at least 1000 mg daily  - Glucosamine-chondrointin 2-3 times daily       Patient Education     Routine physical for adults   The Basics   Written by the doctors and editors at [facility]   What is a physical? -- A physical is a routine visit, or \"check-up,\" with your doctor. You might also hear it called a \"wellness visit\" or \"preventive visit.\"  During each visit, the doctor will:   Ask about your physical and mental health   Ask about your habits, behaviors, and lifestyle   Do an exam   Give you vaccines if needed   Talk to you about any medicines you take   Give advice about your health   Answer your questions  Getting regular check-ups is an important part of taking care of your health. It can help your doctor find and treat any problems you have. But it's also important for preventing health problems.  A routine physical is different from a \"sick visit.\" A sick visit is when you see a doctor because of a health concern or problem. Since physicals are scheduled ahead of time, you can think about what you want to ask the doctor.  How often should I get a physical? -- It depends on your age and health. In general, for people age 21 years and older:   If you are younger than 50 years, you might be able to get a physical every 3 years.   If you are 50 years or older, your doctor might recommend a physical every year.  If you have an ongoing health condition, like diabetes or high blood pressure, your doctor will probably want to see you more often.  What happens during a physical? -- In general, each visit will include:   Physical exam - The doctor or nurse will check your height, weight, heart rate, and blood pressure. They will also look at your eyes and ears. They will ask about how you are feeling and whether you have any symptoms that bother you.   Medicines - It's a good idea to bring a list of all " "the medicines you take to each doctor visit. Your doctor will talk to you about your medicines and answer any questions. Tell them if you are having any side effects that bother you. You should also tell them if you are having trouble paying for any of your medicines.   Habits and behaviors - This includes:   Your diet   Your exercise habits   Whether you smoke, drink alcohol, or use drugs   Whether you are sexually active   Whether you feel safe at home  Your doctor will talk to you about things you can do to improve your health and lower your risk of health problems. They will also offer help and support. For example, if you want to quit smoking, they can give you advice and might prescribe medicines. If you want to improve your diet or get more physical activity, they can help you with this, too.   Lab tests, if needed - The tests you get will depend on your age and situation. For example, your doctor might want to check your:   Cholesterol   Blood sugar   Iron level   Vaccines - The recommended vaccines will depend on your age, health, and what vaccines you already had. Vaccines are very important because they can prevent certain serious or deadly infections.   Discussion of screening - \"Screening\" means checking for diseases or other health problems before they cause symptoms. Your doctor can recommend screening based on your age, risk, and preferences. This might include tests to check for:   Cancer, such as breast, prostate, cervical, ovarian, colorectal, prostate, lung, or skin cancer   Sexually transmitted infections, such as chlamydia and gonorrhea   Mental health conditions like depression and anxiety  Your doctor will talk to you about the different types of screening tests. They can help you decide which screenings to have. They can also explain what the results might mean.   Answering questions - The physical is a good time to ask the doctor or nurse questions about your health. If needed, they can " refer you to other doctors or specialists, too.  Adults older than 65 years often need other care, too. As you get older, your doctor will talk to you about:   How to prevent falling at home   Hearing or vision tests   Memory testing   How to take your medicines safely   Making sure that you have the help and support you need at home  All topics are updated as new evidence becomes available and our peer review process is complete.  This topic retrieved from [employer] on: May 02, 2024.  Topic 724513 Version 1.0  Release: 32.4.3 - C32.122  © 2024 [employer] and/or its affiliates. All rights reserved.  Consumer Information Use and Disclaimer   Disclaimer: This generalized information is a limited summary of diagnosis, treatment, and/or medication information. It is not meant to be comprehensive and should be used as a tool to help the user understand and/or assess potential diagnostic and treatment options. It does NOT include all information about conditions, treatments, medications, side effects, or risks that may apply to a specific patient. It is not intended to be medical advice or a substitute for the medical advice, diagnosis, or treatment of a health care provider based on the health care provider's examination and assessment of a patient's specific and unique circumstances. Patients must speak with a health care provider for complete information about their health, medical questions, and treatment options, including any risks or benefits regarding use of medications. This information does not endorse any treatments or medications as safe, effective, or approved for treating a specific patient. [employer] Inc[employer] and its affiliates disclaim any warranty or liability relating to this information or the use thereof.The use of this information is governed by the Terms of Use, available at https://www.wolters"[employer]"uwer.com/en/know/clinical-effectiveness-terms. 2024© [employer]Date, Inc. and its affiliates and/or licensors.  All rights reserved.  Copyright   © 2024 [employer] and/or its affiliates. All rights reserved.

## 2025-06-06 ENCOUNTER — TELEPHONE (OUTPATIENT)
Age: 45
End: 2025-06-06

## 2025-08-06 ENCOUNTER — VBI (OUTPATIENT)
Dept: ADMINISTRATIVE | Facility: OTHER | Age: 45
End: 2025-08-06